# Patient Record
Sex: FEMALE | Employment: UNEMPLOYED | URBAN - METROPOLITAN AREA
[De-identification: names, ages, dates, MRNs, and addresses within clinical notes are randomized per-mention and may not be internally consistent; named-entity substitution may affect disease eponyms.]

---

## 2017-04-06 ENCOUNTER — HOSPITAL ENCOUNTER (EMERGENCY)
Age: 62
Discharge: HOME | End: 2017-04-06
Attending: EMERGENCY MEDICINE
Payer: MEDICARE

## 2017-04-06 ENCOUNTER — APPOINTMENT (OUTPATIENT)
Dept: GENERAL RADIOLOGY | Age: 62
End: 2017-04-06
Attending: EMERGENCY MEDICINE
Payer: MEDICARE

## 2017-04-06 VITALS
SYSTOLIC BLOOD PRESSURE: 130 MMHG | RESPIRATION RATE: 16 BRPM | HEART RATE: 78 BPM | OXYGEN SATURATION: 99 % | TEMPERATURE: 98.2 F | HEIGHT: 66 IN | WEIGHT: 190 LBS | BODY MASS INDEX: 30.53 KG/M2 | DIASTOLIC BLOOD PRESSURE: 78 MMHG

## 2017-04-06 DIAGNOSIS — R52 BODY ACHES: ICD-10-CM

## 2017-04-06 DIAGNOSIS — J06.9 VIRAL URI WITH COUGH: Primary | ICD-10-CM

## 2017-04-06 PROCEDURE — 71020 XR CHEST PA LAT: CPT

## 2017-04-06 PROCEDURE — 74011250637 HC RX REV CODE- 250/637: Performed by: PHYSICIAN ASSISTANT

## 2017-04-06 PROCEDURE — 99283 EMERGENCY DEPT VISIT LOW MDM: CPT

## 2017-04-06 RX ORDER — IBUPROFEN 400 MG/1
800 TABLET ORAL
Status: COMPLETED | OUTPATIENT
Start: 2017-04-06 | End: 2017-04-06

## 2017-04-06 RX ORDER — AZITHROMYCIN 250 MG/1
TABLET, FILM COATED ORAL
Qty: 5 TAB | Refills: 0 | Status: SHIPPED | OUTPATIENT
Start: 2017-04-06 | End: 2019-07-01

## 2017-04-06 RX ORDER — HYDROXYZINE PAMOATE 25 MG/1
50 CAPSULE ORAL
Status: COMPLETED | OUTPATIENT
Start: 2017-04-06 | End: 2017-04-06

## 2017-04-06 RX ADMIN — IBUPROFEN 800 MG: 400 TABLET, FILM COATED ORAL at 07:54

## 2017-04-06 RX ADMIN — HYDROXYZINE PAMOATE 50 MG: 25 CAPSULE ORAL at 07:54

## 2017-04-06 NOTE — ED NOTES
Bedside shift change report given to CARLOS Streeter (oncoming nurse) by Aleksey Narayanan RN   (offgoing nurse). Report included the following information SBAR, ED Summary, Intake/Output, MAR and Recent Results.

## 2017-04-06 NOTE — ED NOTES
Pt aox3. Pt c/o generalized body aches, productive cough with green/yellow sputum, and congestion x2 weeks. Pt states \"i have tried everything and it will not go away\".

## 2017-04-06 NOTE — ED PROVIDER NOTES
HPI Comments: 61yo female with history depression presents to ER complaining of cough, chest soreness with coughing, productive cough, body ache, nasal congestion, fever/chills. States symptoms started about 3 weeks ago. States she has been taking OTC cough and decongestant medications without relief. Admits to wheezing and SOB. Denies any history of smoking. Denies chest pain. Patient is a 64 y.o. female presenting with cough and general illness. Cough   Associated symptoms include chills, rhinorrhea, myalgias, shortness of breath and wheezing. Pertinent negatives include no chest pain, no headaches, no sore throat, no nausea and no vomiting. Generalized Body Aches   Associated symptoms include shortness of breath. Pertinent negatives include no chest pain, no abdominal pain and no headaches. Past Medical History:   Diagnosis Date    Hypertension     Other ill-defined conditions     High cholesterol    Psychiatric disorder     Depression       Past Surgical History:   Procedure Laterality Date    BREAST SURGERY PROCEDURE UNLISTED      Clogged milk duct (surgical)    HX GYN      Hysterectomy    HX ORTHOPAEDIC      Broken hip         History reviewed. No pertinent family history. Social History     Social History    Marital status:      Spouse name: N/A    Number of children: N/A    Years of education: N/A     Occupational History    Not on file. Social History Main Topics    Smoking status: Not on file    Smokeless tobacco: Not on file    Alcohol use Yes      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 [id number]    Drug use: No      Comment: In residential treatment    Sexual activity: Not on file     Other Topics Concern    Not on file     Social History Narrative         ALLERGIES: Doxycycline and Pcn [penicillins]    Review of Systems   Constitutional: Positive for activity change, chills, fatigue and fever. Negative for appetite change.    HENT: Positive for congestion and rhinorrhea. Negative for sore throat. Eyes: Negative. Respiratory: Positive for cough, shortness of breath and wheezing. Cardiovascular: Negative for chest pain. Gastrointestinal: Negative for abdominal pain, nausea and vomiting. Genitourinary: Negative for difficulty urinating. Musculoskeletal: Positive for myalgias. Negative for back pain, gait problem and neck pain. Neurological: Negative. Negative for weakness, numbness and headaches. All other systems reviewed and are negative. Vitals:    04/06/17 0542 04/06/17 0622   BP: (!) 145/92    Pulse: 73    Resp: 21    Temp: 98.2 °F (36.8 °C)    SpO2: 97% 97%   Weight: 86.2 kg (190 lb)    Height: 5' 6\" (1.676 m)             Physical Exam   Constitutional: She is oriented to person, place, and time. She appears well-developed and well-nourished. No distress. HENT:   Nose: Right sinus exhibits no maxillary sinus tenderness and no frontal sinus tenderness. Left sinus exhibits no maxillary sinus tenderness and no frontal sinus tenderness. Mouth/Throat: Oropharynx is clear and moist.   Scant nasal drainage. Eyes: Conjunctivae and EOM are normal. Pupils are equal, round, and reactive to light. Neck: Normal range of motion. Neck supple. Cardiovascular: Normal rate, regular rhythm and normal heart sounds. No murmur heard. Pulmonary/Chest: Effort normal and breath sounds normal. She has no wheezes. She has no rales. Abdominal: Soft. Bowel sounds are normal. She exhibits no distension. There is no tenderness. There is no guarding. Musculoskeletal: Normal range of motion. She exhibits no edema or tenderness. Lymphadenopathy:     She has no cervical adenopathy. Neurological: She is alert and oriented to person, place, and time. Skin: She is not diaphoretic. Psychiatric:   Slightly anxious affect   Nursing note and vitals reviewed.        MDM  Number of Diagnoses or Management Options  Body aches:   Viral URI with cough: Diagnosis management comments: 63yo female with cough and body aches. Symptoms started 3 weeks ago. Afebrile, BP slightly elevated, rest of vitals WNL. CXR without infiltrate but will cover with zpak given duration and patient expectations. PCP follow up.      ED Course       Procedures     CXR- no obvious infiltrate

## 2017-04-07 ENCOUNTER — OFFICE VISIT (OUTPATIENT)
Dept: ORTHOPEDIC SURGERY | Facility: CLINIC | Age: 62
End: 2017-04-07

## 2017-04-07 VITALS
HEIGHT: 66 IN | TEMPERATURE: 96.8 F | HEART RATE: 77 BPM | WEIGHT: 173.8 LBS | SYSTOLIC BLOOD PRESSURE: 136 MMHG | DIASTOLIC BLOOD PRESSURE: 95 MMHG | BODY MASS INDEX: 27.93 KG/M2

## 2017-04-07 DIAGNOSIS — M25.512 LEFT SHOULDER PAIN, UNSPECIFIED CHRONICITY: ICD-10-CM

## 2017-04-07 DIAGNOSIS — Z98.890 S/P ORIF (OPEN REDUCTION INTERNAL FIXATION) FRACTURE: ICD-10-CM

## 2017-04-07 DIAGNOSIS — M19.012 PRIMARY OSTEOARTHRITIS OF LEFT SHOULDER: Primary | ICD-10-CM

## 2017-04-07 DIAGNOSIS — Z86.59 HISTORY OF SUICIDAL IDEATION: ICD-10-CM

## 2017-04-07 DIAGNOSIS — M25.551 RIGHT HIP PAIN: ICD-10-CM

## 2017-04-07 DIAGNOSIS — Z87.81 S/P ORIF (OPEN REDUCTION INTERNAL FIXATION) FRACTURE: ICD-10-CM

## 2017-04-07 DIAGNOSIS — M16.0 PRIMARY OSTEOARTHRITIS OF BOTH HIPS: ICD-10-CM

## 2017-04-07 DIAGNOSIS — M54.5 LOW BACK PAIN, UNSPECIFIED BACK PAIN LATERALITY, UNSPECIFIED CHRONICITY, WITH SCIATICA PRESENCE UNSPECIFIED: ICD-10-CM

## 2017-04-07 NOTE — PATIENT INSTRUCTIONS
Hip Arthritis: Exercises  Your Care Instructions  Here are some examples of exercises for hip arthritis. Start each exercise slowly. Ease off the exercise if you start to have pain. Your doctor or physical therapist will tell you when you can start these exercises and which ones will work best for you. How to do the exercises  Straight-leg raises to the outside    1. Lie on your side, with your affected hip on top. 2. Tighten the front thigh muscles of your top leg to keep your knee straight. 3. Keep your hip and your leg straight in line with the rest of your body, and keep your knee pointing forward. Do not drop your hip back. 4. Lift your top leg straight up toward the ceiling, about 12 inches off the floor. Hold for about 6 seconds, then slowly lower your leg. 5. Repeat 8 to 12 times. 6. Switch legs and repeat steps 1 through 5, even if only one hip is sore. Straight-leg raises to the inside    1. Lie on your side with your affected hip on the floor. 2. You can either prop up your other leg on a chair, or you can bend that knee and put that foot in front of your other knee. Do not drop your hip back. 3. Tighten the muscles on the front thigh of your bottom leg to straighten that knee. 4. Keep your kneecap pointing forward and your leg straight, and lift your bottom leg up toward the ceiling about 6 inches. Hold for about 6 seconds, then lower slowly. 5. Repeat 8 to 12 times. 6. Switch legs and repeat steps 1 through 5, even if only one hip is sore. Hip hike    1. Stand sideways on the bottom step of a staircase, and hold on to the banister or wall. 2. Keeping both knees straight, lift your good leg off the step and let it hang down. Then hike your good hip up to the same level as your affected hip or a little higher. 3. Repeat 8 to 12 times. 4. Switch legs and repeat steps 1 through 3, even if only one hip is sore. Bridging    1. Lie on your back with both knees bent.  Your knees should be bent about 90 degrees. 2. Then push your feet into the floor, squeeze your buttocks, and lift your hips off the floor until your shoulders, hips, and knees are all in a straight line. 3. Hold for about 6 seconds as you continue to breathe normally, and then slowly lower your hips back down to the floor and rest for up to 10 seconds. 4. Repeat 8 to 12 times. Hamstring stretch (lying down)    1. Lie flat on your back with your legs straight. If you feel discomfort in your back, place a small towel roll under your lower back. 2. Holding the back of your affected leg, lift your leg straight up and toward your body until you feel a stretch at the back of your thigh. 3. Hold the stretch for at least 30 seconds. 4. Repeat 2 to 4 times. 5. Switch legs and repeat steps 1 through 4, even if only one hip is sore. Standing quadriceps stretch    1. If you are not steady on your feet, hold on to a chair, counter, or wall. You can also lie on your stomach or your side to do this exercise. 2. Bend the knee of the leg you want to stretch, and reach behind you to grab the front of your foot or ankle with the hand on the same side. For example, if you are stretching your right leg, use your right hand. 3. Keeping your knees next to each other, pull your foot toward your buttock until you feel a gentle stretch across the front of your hip and down the front of your thigh. Your knee should be pointed directly to the ground, and not out to the side. 4. Hold the stretch for at least 15 to 30 seconds. 5. Repeat 2 to 4 times. 6. Switch legs and repeat steps 1 through 5, even if only one hip is sore. Hip rotator stretch    1. Lie on your back with both knees bent and your feet flat on the floor. 2. Put the ankle of your affected leg on your opposite thigh near your knee. 3. Use your hand to gently push your knee away from your body until you feel a gentle stretch around your hip.   4. Hold the stretch for 15 to 30 seconds. 5. Repeat 2 to 4 times. 6. Repeat steps 1 through 5, but this time use your hand to gently pull your knee toward your opposite shoulder. 7. Switch legs and repeat steps 1 through 6, even if only one hip is sore. Knee-to-chest    1. Lie on your back with your knees bent and your feet flat on the floor. 2. Bring your affected leg to your chest, keeping the other foot flat on the floor (or keeping the other leg straight, whichever feels better on your lower back). 3. Keep your lower back pressed to the floor. Hold for at least 15 to 30 seconds. 4. Relax, and lower the knee to the starting position. 5. Repeat 2 to 4 times. 6. Switch legs and repeat steps 1 through 5, even if only one hip is sore. 7. To get more stretch, put your other leg flat on the floor while pulling your knee to your chest.  Clamshell    1. Lie on your side, with your affected hip on top. Keep your feet and knees together and your knees bent. 2. Raise your top knee, but keep your feet together. Do not let your hips roll back. Your legs should open up like a clamshell. 3. Hold for 6 seconds. 4. Slowly lower your knee back down. Rest for 10 seconds. 5. Repeat 8 to 12 times. 6. Switch legs and repeat steps 1 through 5, even if only one hip is sore. Follow-up care is a key part of your treatment and safety. Be sure to make and go to all appointments, and call your doctor if you are having problems. It's also a good idea to know your test results and keep a list of the medicines you take. Where can you learn more? Go to http://oziel-sanchez.info/. Enter C613 in the search box to learn more about \"Hip Arthritis: Exercises. \"  Current as of: May 23, 2016  Content Version: 11.2  © 4044-1404 [employer]. Care instructions adapted under license by [employer] (which disclaims liability or warranty for this information).  If you have questions about a medical condition or this instruction, always ask your healthcare professional. [place] [address] any warranty or liability for your use of this information. Shoulder Arthritis: Exercises  Your Care Instructions  Here are some examples of typical rehabilitation exercises for your condition. Start each exercise slowly. Ease off the exercise if you start to have pain. Your doctor or physical therapist will tell you when you can start these exercises and which ones will work best for you. How to do the exercises  Shoulder flexion (lying down)    Note: To make a wand for this exercise, use a piece of PVC pipe or a broom handle with the broom removed. Make the wand about a foot wider than your shoulders. 7. Lie on your back, holding a wand with both hands. Your palms should face down as you hold the wand. 8. Keeping your elbows straight, slowly raise your arms over your head. Raise them until you feel a stretch in your shoulders, upper back, and chest.  9. Hold for 15 to 30 seconds. 10. Repeat 2 to 4 times. Shoulder rotation (lying down)    Note: To make a wand for this exercise, use a piece of PVC pipe or a broom handle with the broom removed. Make the wand about a foot wider than your shoulders. 7. Lie on your back. Hold a wand with both hands with your elbows bent and palms up. 8. Keep your elbows close to your body, and move the wand across your body toward the sore arm. 9. Hold for 8 to 12 seconds. 10. Repeat 2 to 4 times. Shoulder internal rotation with towel    5. Hold a towel above and behind your head with the arm that is not sore. 6. With your sore arm, reach behind your back and grasp the towel. 7. With the arm above your head, pull the towel upward. Do this until you feel a stretch on the front and outside of your sore shoulder. 8. Hold 15 to 30 seconds. 9. Repeat 2 to 4 times. Shoulder blade squeeze    5. Stand with your arms at your sides, and squeeze your shoulder blades together.  Do not raise your shoulders up as you squeeze. 6. Hold 6 seconds. 7. Repeat 8 to 12 times. Resisted rows    Note: For this exercise, you will need elastic exercise material, such as surgical tubing or Thera-Band. 6. Put the band around a solid object at about waist level. (A bedpost will work well.) Each hand should hold an end of the band. 7. With your elbows at your sides and bent to 90 degrees, pull the band back. Your shoulder blades should move toward each other. Return to the starting position. 8. Repeat 8 to 12 times. External rotator strengthening exercise    7. Start by tying a piece of elastic exercise material to a doorknob. You can use surgical tubing or Thera-Band. (You may also hold one end of the band in each hand.)  8. Stand or sit with your shoulder relaxed and your elbow bent 90 degrees. Your upper arm should rest comfortably against your side. Squeeze a rolled towel between your elbow and your body for comfort. This will help keep your arm at your side. 9. Hold one end of the elastic band with the hand of the painful arm. 10. Start with your forearm across your belly. Slowly rotate the forearm out away from your body. Keep your elbow and upper arm tucked against the towel roll or the side of your body until you begin to feel tightness in your shoulder. Slowly move your arm back to where you started. 11. Repeat 8 to 12 times. Internal rotator strengthening exercise    8. Start by tying a piece of elastic exercise material to a doorknob. You can use surgical tubing or Thera-Band. 9. Stand or sit with your shoulder relaxed and your elbow bent 90 degrees. Your upper arm should rest comfortably against your side. Squeeze a rolled towel between your elbow and your body for comfort. This will help keep your arm at your side. 10. Hold one end of the elastic band in the hand of the painful arm. 11. Slowly rotate your forearm toward your body until it touches your belly.  Slowly move it back to where you started. 12. Keep your elbow and upper arm firmly tucked against the towel roll or at your side. 13. Repeat 8 to 12 times. Pendulum swing    Note: If you have pain in your back, do not do this exercise. 8. Hold on to a table or the back of a chair with your good arm. Then bend forward a little and let your sore arm hang straight down. This exercise does not use the arm muscles. Rather, use your legs and your hips to create movement that makes your arm swing freely. 9. Use the movement from your hips and legs to guide the slightly swinging arm back and forth like a pendulum (or elephant trunk). Then guide it in circles that start small (about the size of a dinner plate). Make the circles a bit larger each day, as your pain allows. 10. Do this exercise for 5 minutes, 5 to 7 times each day. 11. As you have less pain, try bending over a little farther to do this exercise. This will increase the amount of movement at your shoulder. Follow-up care is a key part of your treatment and safety. Be sure to make and go to all appointments, and call your doctor if you are having problems. It's also a good idea to know your test results and keep a list of the medicines you take. Where can you learn more? Go to http://oziel-sanchez.info/. Enter H562 in the search box to learn more about \"Shoulder Arthritis: Exercises. \"  Current as of: May 23, 2016  Content Version: 11.2  © 9582-2732 [employer]. Care instructions adapted under license by [employer] (which disclaims liability or warranty for this information). If you have questions about a medical condition or this instruction, always ask your healthcare professional. [place] [address] any warranty or liability for your use of this information.

## 2017-04-07 NOTE — PROGRESS NOTES
Chief Complaint   Patient presents with    Shoulder Pain     Left    Back Pain     Lower    Hip Pain     Right
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
## 2017-04-07 NOTE — MR AVS SNAPSHOT
Visit Information Date & Time Provider Department Dept. Phone Encounter #  
 4/7/2017 10:50 AM Anya Quintanilla MD [facility] - [facility] [phone] Follow-up Instructions Return if symptoms worsen or fail to improve. Your Appointments 4/7/2017 10:50 AM  
New Patient with Anya Quintanilla MD  
[address] [facility], [address] and [facility] - [facility] Appt Note: hip pain nx humana mdcr, refd nikko reich  
 [address], [address]  
[phone]  
  
   
 [address], [address] [address] Upcoming Health Maintenance Date Due Hepatitis C Screening 1955 DTaP/Tdap/Td series (1 - Tdap) 9/30/1976 PAP AKA CERVICAL CYTOLOGY 9/30/1976 BREAST CANCER SCRN MAMMOGRAM 9/30/2005 FOBT Q 1 YEAR AGE 50-75 9/30/2005 ZOSTER VACCINE AGE 60> 9/30/2015 INFLUENZA AGE 9 TO ADULT 8/1/2016 Allergies as of 4/7/2017  Review Complete On: 4/7/2017 By: Anya Quintanilla MD  
  
 Severity Noted Reaction Type Reactions Doxycycline  12/27/2012    Swelling Pcn [Penicillins]  02/23/2011    Hives PATIENT IS NOT ALLERGIC TO AMOXICLLIN. Current Immunizations  Never Reviewed No immunizations on file. Not reviewed this visit You Were Diagnosed With   
  
 Codes Comments Primary osteoarthritis of left shoulder    -  Primary ICD-10-CM: M19.012 
ICD-9-CM: 715.11 Moderate Left shoulder pain, unspecified chronicity     ICD-10-CM: M25.512 ICD-9-CM: 719.41 Low back pain, unspecified back pain laterality, unspecified chronicity, with sciatica presence unspecified     ICD-10-CM: M54.5 ICD-9-CM: 724.2 Right hip pain     ICD-10-CM: M25.551 ICD-9-CM: 719.45 Primary osteoarthritis of both hips     ICD-10-CM: M16.0 ICD-9-CM: 715.15 Moderate  S/P ORIF (open reduction internal fixation) fracture     ICD-10-CM: Z96.7, Z87.81 
 ICD-9-CM: V45.89, V15.51 Right hip History of suicidal ideation     ICD-10-CM: Z86.59 
ICD-9-CM: V11.8 Vitals BP Pulse Temp Height(growth percentile) Weight(growth percentile) BMI  
 (!) 136/95 77 96.8 °F (36 °C) (Oral) 5' 6\" (1.676 m) 173 lb 12.8 oz (78.8 kg) 28.05 kg/m2 OB Status Smoking Status Hysterectomy Former Smoker BMI and BSA Data Body Mass Index Body Surface Area 28.05 kg/m 2 1.92 m 2 Your Updated Medication List  
  
   
This list is accurate as of: 4/7/17 10:34 AM.  Always use your most recent med list. amLODIPine 10 mg tablet Commonly known as:  Opal Heymann Take 10 mg by mouth daily. azithromycin 250 mg tablet Commonly known as:  Mona Lematien Take two tablets today then one tablet daily  
  
 calcium-vitamin D 250-125 mg-unit tablet Commonly known as:  OSCAL 250 Take 1 Tab by mouth daily. dextromethorphan-guaiFENesin  mg/5 mL syrup Commonly known as:  ROBITUSSIN-DM Take 10 mL by mouth every six (6) hours as needed for Cough. FISH OIL 1,000 mg Cap Generic drug:  omega-3 fatty acids-vitamin e Take  by mouth. LIPITOR 20 mg tablet Generic drug:  atorvastatin Take  by mouth daily. lisinopril 20 mg tablet Commonly known as:  Joanne Feil Take 20 mg by mouth daily. valACYclovir 500 mg tablet Commonly known as:  VALTREX Take  by mouth two (2) times a day. We Performed the Following AMB POC X-RAY RADEX HIP UNI WITH PELVIS 2-3 VIEWS [06418 CPT(R)] AMB POC XRAY, SHOULDER; COMPLETE, 2+ [17674 CPT(R)] REFERRAL TO PAIN MANAGEMENT [VVE987 Custom] Comments:  
 medication management REFERRAL TO PSYCHIATRY [REF91 Custom] Comments:  
 [facility]. Follow-up Instructions Return if symptoms worsen or fail to improve. Referral Information Referral ID Referred By Referred To [id number] Zee BARRERA Not Available Visits Status Start Date End Date 1 New Request 4/7/17 4/7/18 If your referral has a status of pending review or denied, additional information will be sent to support the outcome of this decision. Referral ID Referred By Referred To  
 [id number] Zee BARRERA Not Available Visits Status Start Date End Date 1 New Request 4/7/17 4/7/18 If your referral has a status of pending review or denied, additional information will be sent to support the outcome of this decision. Patient Instructions Hip Arthritis: Exercises Your Care Instructions Here are some examples of exercises for hip arthritis. Start each exercise slowly. Ease off the exercise if you start to have pain. Your doctor or physical therapist will tell you when you can start these exercises and which ones will work best for you. How to do the exercises Straight-leg raises to the outside 1. Lie on your side, with your affected hip on top. 2. Tighten the front thigh muscles of your top leg to keep your knee straight. 3. Keep your hip and your leg straight in line with the rest of your body, and keep your knee pointing forward. Do not drop your hip back. 4. Lift your top leg straight up toward the ceiling, about 12 inches off the floor. Hold for about 6 seconds, then slowly lower your leg. 5. Repeat 8 to 12 times. 6. Switch legs and repeat steps 1 through 5, even if only one hip is sore. Straight-leg raises to the inside 1. Lie on your side with your affected hip on the floor. 2. You can either prop up your other leg on a chair, or you can bend that knee and put that foot in front of your other knee. Do not drop your hip back. 3. Tighten the muscles on the front thigh of your bottom leg to straighten that knee. 4. Keep your kneecap pointing forward and your leg straight, and lift your bottom leg up toward the ceiling about 6 inches.  Hold for about 6 seconds, then lower slowly. 5. Repeat 8 to 12 times. 6. Switch legs and repeat steps 1 through 5, even if only one hip is sore. Hip hike 1. Stand sideways on the bottom step of a staircase, and hold on to the banister or wall. 2. Keeping both knees straight, lift your good leg off the step and let it hang down. Then hike your good hip up to the same level as your affected hip or a little higher. 3. Repeat 8 to 12 times. 4. Switch legs and repeat steps 1 through 3, even if only one hip is sore. Bridging 1. Lie on your back with both knees bent. Your knees should be bent about 90 degrees. 2. Then push your feet into the floor, squeeze your buttocks, and lift your hips off the floor until your shoulders, hips, and knees are all in a straight line. 3. Hold for about 6 seconds as you continue to breathe normally, and then slowly lower your hips back down to the floor and rest for up to 10 seconds. 4. Repeat 8 to 12 times. Hamstring stretch (lying down) 1. Lie flat on your back with your legs straight. If you feel discomfort in your back, place a small towel roll under your lower back. 2. Holding the back of your affected leg, lift your leg straight up and toward your body until you feel a stretch at the back of your thigh. 3. Hold the stretch for at least 30 seconds. 4. Repeat 2 to 4 times. 5. Switch legs and repeat steps 1 through 4, even if only one hip is sore. Standing quadriceps stretch 1. If you are not steady on your feet, hold on to a chair, counter, or wall. You can also lie on your stomach or your side to do this exercise. 2. Bend the knee of the leg you want to stretch, and reach behind you to grab the front of your foot or ankle with the hand on the same side. For example, if you are stretching your right leg, use your right hand.  
3. Keeping your knees next to each other, pull your foot toward your buttock until you feel a gentle stretch across the front of your hip and down the front of your thigh. Your knee should be pointed directly to the ground, and not out to the side. 4. Hold the stretch for at least 15 to 30 seconds. 5. Repeat 2 to 4 times. 6. Switch legs and repeat steps 1 through 5, even if only one hip is sore. Hip rotator stretch 1. Lie on your back with both knees bent and your feet flat on the floor. 2. Put the ankle of your affected leg on your opposite thigh near your knee. 3. Use your hand to gently push your knee away from your body until you feel a gentle stretch around your hip. 4. Hold the stretch for 15 to 30 seconds. 5. Repeat 2 to 4 times. 6. Repeat steps 1 through 5, but this time use your hand to gently pull your knee toward your opposite shoulder. 7. Switch legs and repeat steps 1 through 6, even if only one hip is sore. Knee-to-chest 
 
1. Lie on your back with your knees bent and your feet flat on the floor. 2. Bring your affected leg to your chest, keeping the other foot flat on the floor (or keeping the other leg straight, whichever feels better on your lower back). 3. Keep your lower back pressed to the floor. Hold for at least 15 to 30 seconds. 4. Relax, and lower the knee to the starting position. 5. Repeat 2 to 4 times. 6. Switch legs and repeat steps 1 through 5, even if only one hip is sore. 7. To get more stretch, put your other leg flat on the floor while pulling your knee to your chest. 
Clamshell 1. Lie on your side, with your affected hip on top. Keep your feet and knees together and your knees bent. 2. Raise your top knee, but keep your feet together. Do not let your hips roll back. Your legs should open up like a clamshell. 3. Hold for 6 seconds. 4. Slowly lower your knee back down. Rest for 10 seconds. 5. Repeat 8 to 12 times. 6. Switch legs and repeat steps 1 through 5, even if only one hip is sore. Follow-up care is a key part of your treatment and safety.  Be sure to make and go to all appointments, and call your doctor if you are having problems. It's also a good idea to know your test results and keep a list of the medicines you take. Where can you learn more? Go to http://oziel-sanchez.info/. Enter Q473 in the search box to learn more about \"Hip Arthritis: Exercises. \" Current as of: May 23, 2016 Content Version: 11.2 © 2290-4598 [employer]. Care instructions adapted under license by [employer] (which disclaims liability or warranty for this information). If you have questions about a medical condition or this instruction, always ask your healthcare professional. [address] any warranty or liability for your use of this information. Shoulder Arthritis: Exercises Your Care Instructions Here are some examples of typical rehabilitation exercises for your condition. Start each exercise slowly. Ease off the exercise if you start to have pain. Your doctor or physical therapist will tell you when you can start these exercises and which ones will work best for you. How to do the exercises Shoulder flexion (lying down) Note: To make a wand for this exercise, use a piece of PVC pipe or a broom handle with the broom removed. Make the wand about a foot wider than your shoulders. 7. Lie on your back, holding a wand with both hands. Your palms should face down as you hold the wand. 8. Keeping your elbows straight, slowly raise your arms over your head. Raise them until you feel a stretch in your shoulders, upper back, and chest. 
9. Hold for 15 to 30 seconds. 10. Repeat 2 to 4 times. Shoulder rotation (lying down) Note: To make a wand for this exercise, use a piece of PVC pipe or a broom handle with the broom removed. Make the wand about a foot wider than your shoulders. 7. Lie on your back. Hold a wand with both hands with your elbows bent and palms up. 8. Keep your elbows close to your body, and move the wand across your body toward the sore arm. 9. Hold for 8 to 12 seconds. 10. Repeat 2 to 4 times. Shoulder internal rotation with towel 5. Hold a towel above and behind your head with the arm that is not sore. 6. With your sore arm, reach behind your back and grasp the towel. 7. With the arm above your head, pull the towel upward. Do this until you feel a stretch on the front and outside of your sore shoulder. 8. Hold 15 to 30 seconds. 9. Repeat 2 to 4 times. Shoulder blade squeeze 5. Stand with your arms at your sides, and squeeze your shoulder blades together. Do not raise your shoulders up as you squeeze. 6. Hold 6 seconds. 7. Repeat 8 to 12 times. Resisted rows Note: For this exercise, you will need elastic exercise material, such as surgical tubing or Thera-Band. 6. Put the band around a solid object at about waist level. (A bedpost will work well.) Each hand should hold an end of the band. 7. With your elbows at your sides and bent to 90 degrees, pull the band back. Your shoulder blades should move toward each other. Return to the starting position. 8. Repeat 8 to 12 times. External rotator strengthening exercise 7. Start by tying a piece of elastic exercise material to a doorknob. You can use surgical tubing or Thera-Band. (You may also hold one end of the band in each hand.) 8. Stand or sit with your shoulder relaxed and your elbow bent 90 degrees. Your upper arm should rest comfortably against your side. Squeeze a rolled towel between your elbow and your body for comfort. This will help keep your arm at your side. 9. Hold one end of the elastic band with the hand of the painful arm. 10. Start with your forearm across your belly. Slowly rotate the forearm out away from your body.  Keep your elbow and upper arm tucked against the towel roll or the side of your body until you begin to feel tightness in your shoulder. Slowly move your arm back to where you started. 11. Repeat 8 to 12 times. Internal rotator strengthening exercise 8. Start by tying a piece of elastic exercise material to a doorknob. You can use surgical tubing or Thera-Band. 9. Stand or sit with your shoulder relaxed and your elbow bent 90 degrees. Your upper arm should rest comfortably against your side. Squeeze a rolled towel between your elbow and your body for comfort. This will help keep your arm at your side. 10. Hold one end of the elastic band in the hand of the painful arm. 11. Slowly rotate your forearm toward your body until it touches your belly. Slowly move it back to where you started. 12. Keep your elbow and upper arm firmly tucked against the towel roll or at your side. 13. Repeat 8 to 12 times. Pendulum swing Note: If you have pain in your back, do not do this exercise. 8. Hold on to a table or the back of a chair with your good arm. Then bend forward a little and let your sore arm hang straight down. This exercise does not use the arm muscles. Rather, use your legs and your hips to create movement that makes your arm swing freely. 9. Use the movement from your hips and legs to guide the slightly swinging arm back and forth like a pendulum (or elephant trunk). Then guide it in circles that start small (about the size of a dinner plate). Make the circles a bit larger each day, as your pain allows. 10. Do this exercise for 5 minutes, 5 to 7 times each day. 11. As you have less pain, try bending over a little farther to do this exercise. This will increase the amount of movement at your shoulder. Follow-up care is a key part of your treatment and safety. Be sure to make and go to all appointments, and call your doctor if you are having problems. It's also a good idea to know your test results and keep a list of the medicines you take. Where can you learn more? Go to http://oziel-sanchez.info/. Enter H562 in the search box to learn more about \"Shoulder Arthritis: Exercises. \" Current as of: May 23, 2016 Content Version: 11.2 © 0541-0247 [employer], [email or web address]. Care instructions adapted under license by [employer] (which disclaims liability or warranty for this information). If you have questions about a medical condition or this instruction, always ask your healthcare professional. [address] any warranty or liability for your use of this information. Introducing \[facility]\"" & HEALTH SERVICES! Travis Rajput introduces [employer] patient portal. Now you can access parts of your medical record, email your doctor's office, and request medication refills online. 1. In your internet browser, go to [email or web address] 2. Click on the First Time User? Click Here link in the Sign In box. You will see the New Member Sign Up page. 3. Enter your [employer] Access Code exactly as it appears below. You will not need to use this code after youve completed the sign-up process. If you do not sign up before the expiration date, you must request a new code. · [employer] Access Code: [email or web address] Expires: 7/5/2017  5:36 AM 
 
4. Enter the last four digits of your Social Security Number (xxxx) and Date of Birth (mm/dd/yyyy) as indicated and click Submit. You will be taken to the next sign-up page. 5. Create a [employer] ID. This will be your [employer] login ID and cannot be changed, so think of one that is secure and easy to remember. 6. Create a [employer] password. You can change your password at any time. 7. Enter your Password Reset Question and Answer. This can be used at a later time if you forget your password. 8. Enter your e-mail address. You will receive e-mail notification when new information is available in [address]. 9. Click Sign Up. You can now view and download portions of your medical record. 10. Click the Download Summary menu link to download a portable copy of your medical information. If you have questions, please visit the Frequently Asked Questions section of the [employer] website. Remember, [employer] is NOT to be used for urgent needs. For medical emergencies, dial 911. Now available from your iPhone and Android! Please provide this summary of care documentation to your next provider. Your primary care clinician is listed as [address] [place] [address]. If you have any questions after today's visit, please call [phone].

## 2017-08-29 ENCOUNTER — HOSPITAL ENCOUNTER (OUTPATIENT)
Dept: GENERAL RADIOLOGY | Age: 62
Discharge: HOME | End: 2017-08-29
Payer: MEDICARE

## 2017-08-29 DIAGNOSIS — R05.9 COUGH: ICD-10-CM

## 2017-08-29 PROCEDURE — 71020 XR CHEST PA LAT: CPT

## 2019-02-01 ENCOUNTER — OFFICE VISIT (OUTPATIENT)
Dept: ORTHOPEDIC SURGERY | Facility: CLINIC | Age: 64
End: 2019-02-01

## 2019-02-01 VITALS
DIASTOLIC BLOOD PRESSURE: 84 MMHG | WEIGHT: 179 LBS | SYSTOLIC BLOOD PRESSURE: 150 MMHG | BODY MASS INDEX: 28.77 KG/M2 | RESPIRATION RATE: 16 BRPM | OXYGEN SATURATION: 98 % | HEIGHT: 66 IN | TEMPERATURE: 97.6 F | HEART RATE: 73 BPM

## 2019-02-01 DIAGNOSIS — M19.012 PRIMARY OSTEOARTHRITIS OF LEFT SHOULDER: ICD-10-CM

## 2019-02-01 DIAGNOSIS — M25.512 LEFT SHOULDER PAIN, UNSPECIFIED CHRONICITY: Primary | ICD-10-CM

## 2019-02-01 RX ORDER — DICLOFENAC SODIUM 50 MG/1
50 TABLET, DELAYED RELEASE ORAL 2 TIMES DAILY WITH MEALS
Qty: 180 TAB | Refills: 2 | Status: SHIPPED | OUTPATIENT
Start: 2019-02-01

## 2019-02-01 RX ORDER — TRIAMCINOLONE ACETONIDE 40 MG/ML
40 INJECTION, SUSPENSION INTRA-ARTICULAR; INTRAMUSCULAR ONCE
Qty: 1 ML | Refills: 0
Start: 2019-02-01 | End: 2019-02-01

## 2019-02-01 NOTE — PROGRESS NOTES
Patient: Jacquelynn Buerger                MRN: [id number]       SSN: [id number] YOB: 1955        AGE: 61 y.o. SEX: female Body mass index is 28.89 kg/m². PCP: Clark West MD 
02/01/19 Chief Complaint: Left shoulder pain HISTORY OF PRESENT ILLNESS:   Romulo Issa is a 61year-old female who comes in the office today with years of left shoulder pain. The pain is worse when she tries to reach overhead. She also has pain at night. She has been seen several places and had an injection in her shoulder, which she says did not help her pain. She reports pain as 9/10 on a pain scale. She has had no advanced imaging recently. Past Medical History:  
Diagnosis Date  Hypertension  Other ill-defined conditions(799.89) High cholesterol  Psychiatric disorder Depression History reviewed. No pertinent family history. Current Outpatient Medications Medication Sig Dispense Refill  diclofenac EC (VOLTAREN) 50 mg EC tablet Take 1 Tab by mouth two (2) times daily (with meals). 180 Tab 2  
 triamcinolone acetonide (KENALOG) 40 mg/mL injection 1 mL by Intra artICUlar route once for 1 dose. 1 mL 0  
 amlodipine (NORVASC) 10 mg tablet Take 10 mg by mouth daily.  lisinopril (PRINIVIL, ZESTRIL) 20 mg tablet Take 20 mg by mouth daily.  atorvastatin (LIPITOR) 20 mg tablet Take  by mouth daily.  dextromethorphan-guaiFENesin (ROBITUSSIN-DM)  mg/5 mL syrup Take 10 mL by mouth every six (6) hours as needed for Cough. 240 mL 0  
 azithromycin (ZITHROMAX Z-FÉLIX) 250 mg tablet Take two tablets today then one tablet daily 5 Tab 0  
 omega-3 fatty acids-vitamin e (FISH OIL) 1,000 mg Cap Take  by mouth.  calcium-vitamin D (OSCAL 250) 250-125 mg-unit tablet Take 1 Tab by mouth daily.  valacyclovir (VALTREX) 500 mg tablet Take  by mouth two (2) times a day. Allergies Allergen Reactions  Doxycycline Swelling  Pcn [Penicillins] Hives PATIENT IS NOT ALLERGIC TO AMOXICLLIN. Past Surgical History:  
Procedure Laterality Date  BREAST SURGERY PROCEDURE UNLISTED Clogged milk duct (surgical)  HX GYN Hysterectomy  HX ORTHOPAEDIC Broken hip Social History Socioeconomic History  Marital status: UNKNOWN Spouse name: Not on file  Number of children: Not on file  Years of education: Not on file  Highest education level: Not on file Social Needs  Financial resource strain: Not on file  Food insecurity - worry: Not on file  Food insecurity - inability: Not on file  Transportation needs - medical: Not on file  Transportation needs - non-medical: Not on file Occupational History  Not on file Tobacco Use  Smoking status: Former Smoker  Smokeless tobacco: Never Used Substance and Sexual Activity  Alcohol use: Yes 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 [id number]  Drug use: No  
  Comment: In residential treatment  Sexual activity: Not on file Other Topics Concern  Not on file Social History Narrative  Not on file REVIEW OF SYSTEMS:   
 
CON: negative for recent weight loss/gain, fever, or chills EYE: negative for double or blurry vision ENT: negative for hoarseness RS:   negative for cough, URI, SOB 
CV:  negative for chest pain, palpitations GI:    negative for blood in stool, nausea/vomiting :  negative for blood in urine MS: As per HPI Other systems reviewed and noted below. PHYSICAL EXAMINATION: 
Visit Vitals /84 (BP 1 Location: Left arm, BP Patient Position: Sitting) Pulse 73 Temp 97.6 °F (36.4 °C) (Oral) Resp 16 Ht 5' 6\" (1.676 m) Wt 179 lb (81.2 kg) SpO2 98% BMI 28.89 kg/m² Body mass index is 28.89 kg/m². GENERAL: Alert and oriented x3, in no acute distress, well-developed, well-nourished. HEENT: Normocephalic, atraumatic. RESP: Non labored breathing with equal chest rise on inspiration. CV: Well perfused extremities. No cyanosis or clubbing noted. ABDOMEN: Soft, non-tender, non-distended. PHYSICAL EXAMINATION:   Exam of the left shoulder with no effusion, warmth or erythema. She does have limitations in her range of motion. Forward flexion is only to about 120 degrees. External rotation of 20 degrees. Internal rotation to the posterolateral buttock. Gentle rotator cuff strength testing reveals some pain without any weakness. She is neurovascularly intact distally. Nontender over the [facility] joint. IMAGING:   X-rays of the left shoulder were taken in the office today. These show arthritic changes in the glenohumeral joint. ASSESSMENT/PLAN:   Hanna Chung is a 61year-old female with likely left shoulder arthritis. She may also have a rotator cuff injury. In order to further evaluate this, we discussed an MRI, but she would like to hold off on that for now. I offered her a cortisone injection into the shoulder. She was agreeable to this. Therefore, after discussing the risks and benefits, the left shoulder was injected. I will see her back if she continues to have problems. [facility] PROCEDURE PROGRESS NOTE Chart reviewed for the following: 
 Isa Holguin MD, have reviewed the History, Physical and updated the Allergic reactions for [facility] TIME OUT performed immediately prior to start of procedure: 
 Isa Holguin MD, have performed the following reviews on [facility] prior to the start of the procedure: 
         
* Patient was identified by name and date of birth * Agreement on procedure being performed was verified * Risks and Benefits explained to the patient * Procedure site verified and marked as necessary * Patient was positioned for comfort * Consent was signed and verified Time: 1500 Date of procedure: 2/1/2019 Procedure performed by:  Leonel Tai MD 
 
Provider assisted by: Alexis Nguyễn LPN Patient assisted by: self How tolerated by patient: tolerated the procedure well with no complications Post Procedural Pain Scale: 0 - No Hurt Comments: none Electronically signed by: Leonel Tai MD

## 2019-07-01 ENCOUNTER — ANESTHESIA EVENT (OUTPATIENT)
Dept: ENDOSCOPY | Age: 64
End: 2019-07-01
Payer: MEDICARE

## 2019-07-01 RX ORDER — FAMOTIDINE 20 MG/1
20 TABLET, FILM COATED ORAL ONCE
Status: CANCELLED | OUTPATIENT
Start: 2019-07-01 | End: 2019-07-01

## 2019-07-01 RX ORDER — SODIUM CHLORIDE, SODIUM LACTATE, POTASSIUM CHLORIDE, CALCIUM CHLORIDE 600; 310; 30; 20 MG/100ML; MG/100ML; MG/100ML; MG/100ML
50 INJECTION, SOLUTION INTRAVENOUS CONTINUOUS
Status: CANCELLED | OUTPATIENT
Start: 2019-07-01 | End: 2019-07-02

## 2019-07-01 RX ORDER — LIDOCAINE HYDROCHLORIDE 10 MG/ML
0.1 INJECTION, SOLUTION EPIDURAL; INFILTRATION; INTRACAUDAL; PERINEURAL AS NEEDED
Status: CANCELLED | OUTPATIENT
Start: 2019-07-01

## 2019-07-01 NOTE — PERIOP NOTES
PAT - SURGICAL PRE-ADMISSION INSTRUCTIONS    NAME:  Kal Nation                                                          TODAY'S DATE:  7/1/2019    SURGERY DATE:  7/2/2019                                  SURGERY ARRIVAL TIME:   830 PER OFFICE    1. Do NOT eat or drink anything, including candy or gum, after MIDNIGHT on 7/1/19 , unless you have specific instructions from your Surgeon or Anesthesia Provider to do so. 2. No smoking on the day of surgery. 3. No alcohol 24 hours prior to the day of surgery. 4. No recreational drugs for one week prior to the day of surgery. 5. Leave all valuables, including money/purse, at home. 6. Remove all jewelry, nail polish, makeup (including mascara); no lotions, powders, deodorant, or perfume/cologne/after shave. 7. Glasses/Contact lenses and Dentures may be worn to the hospital.  They will be removed prior to surgery. 8. Call your doctor if symptoms of a cold or illness develop within 24 ours prior to surgery. 9. AN ADULT MUST DRIVE YOU HOME AFTER OUTPATIENT SURGERY. 10. If you are having an OUTPATIENT procedure, please make arrangements for a responsible adult to be with you for 24 hours after your surgery. 11. If you are admitted to the hospital, you will be assigned to a bed after surgery is complete. Normally a family member will not be able to see you until you are in your assigned bed. 15. Family is encouraged to accompany you to the hospital.  We ask visitors in the treatment area to be limited to ONE person at a time to ensure patient privacy. EXCEPTIONS WILL BE MADE AS NEEDED. 15. Children under 12 are discouraged from entering the treatment area and need to be supervised by an adult when in the waiting room. Special Instructions: Take these medications the morning of surgery with a sip of water:  AMLODIPINE, Complete bowel prep per MD instructions.     Patient Prep:    shower with anti-bacterial soap    These surgical instructions were reviewed with PATIENT during the PAT PHONE CALL. Directions: On the morning of surgery, please go to the [facility]. Enter the building from the [facility] entrance, 1st floor (next to the Emergency Room entrance). Take the elevator to the 2nd floor. Sign in at the Registration Desk.     If you have any questions and/or concerns, please do not hesitate to call:  (Prior to the day of surgery)  [facility] unit:  [phone]  (Day of surgery)  [facility] unit:  [phone]

## 2019-07-02 ENCOUNTER — HOSPITAL ENCOUNTER (OUTPATIENT)
Age: 64
Setting detail: OUTPATIENT SURGERY
Discharge: HOME | End: 2019-07-02
Attending: INTERNAL MEDICINE | Admitting: INTERNAL MEDICINE
Payer: MEDICARE

## 2019-07-02 ENCOUNTER — ANESTHESIA (OUTPATIENT)
Dept: ENDOSCOPY | Age: 64
End: 2019-07-02
Payer: MEDICARE

## 2019-07-02 VITALS
HEIGHT: 67 IN | RESPIRATION RATE: 15 BRPM | OXYGEN SATURATION: 97 % | HEART RATE: 82 BPM | SYSTOLIC BLOOD PRESSURE: 106 MMHG | DIASTOLIC BLOOD PRESSURE: 74 MMHG | WEIGHT: 175 LBS | BODY MASS INDEX: 27.47 KG/M2 | TEMPERATURE: 97.9 F

## 2019-07-02 PROCEDURE — 88305 TISSUE EXAM BY PATHOLOGIST: CPT

## 2019-07-02 PROCEDURE — 74011250636 HC RX REV CODE- 250/636

## 2019-07-02 PROCEDURE — 77030031670 HC DEV INFL ENDOTEK BIG60 MRTM -B: Performed by: INTERNAL MEDICINE

## 2019-07-02 PROCEDURE — 76060000032 HC ANESTHESIA 0.5 TO 1 HR: Performed by: INTERNAL MEDICINE

## 2019-07-02 PROCEDURE — 76040000007: Performed by: INTERNAL MEDICINE

## 2019-07-02 PROCEDURE — 74011000250 HC RX REV CODE- 250

## 2019-07-02 RX ORDER — SODIUM CHLORIDE 0.9 % (FLUSH) 0.9 %
5-40 SYRINGE (ML) INJECTION AS NEEDED
Status: CANCELLED | OUTPATIENT
Start: 2019-07-02

## 2019-07-02 RX ORDER — PROPOFOL 10 MG/ML
INJECTION, EMULSION INTRAVENOUS AS NEEDED
Status: DISCONTINUED | OUTPATIENT
Start: 2019-07-02 | End: 2019-07-02 | Stop reason: HOSPADM

## 2019-07-02 RX ORDER — SODIUM CHLORIDE, SODIUM LACTATE, POTASSIUM CHLORIDE, CALCIUM CHLORIDE 600; 310; 30; 20 MG/100ML; MG/100ML; MG/100ML; MG/100ML
INJECTION, SOLUTION INTRAVENOUS
Status: DISCONTINUED | OUTPATIENT
Start: 2019-07-02 | End: 2019-07-02 | Stop reason: HOSPADM

## 2019-07-02 RX ORDER — SODIUM CHLORIDE 0.9 % (FLUSH) 0.9 %
5-40 SYRINGE (ML) INJECTION EVERY 8 HOURS
Status: CANCELLED | OUTPATIENT
Start: 2019-07-02

## 2019-07-02 RX ORDER — FENTANYL CITRATE 50 UG/ML
50 INJECTION, SOLUTION INTRAMUSCULAR; INTRAVENOUS AS NEEDED
Status: CANCELLED | OUTPATIENT
Start: 2019-07-02

## 2019-07-02 RX ORDER — HYDROMORPHONE HYDROCHLORIDE 2 MG/ML
0.5 INJECTION, SOLUTION INTRAMUSCULAR; INTRAVENOUS; SUBCUTANEOUS
Status: CANCELLED | OUTPATIENT
Start: 2019-07-02

## 2019-07-02 RX ORDER — DEXTROMETHORPHAN/PSEUDOEPHED 2.5-7.5/.8
1.2 DROPS ORAL
Status: CANCELLED | OUTPATIENT
Start: 2019-07-02

## 2019-07-02 RX ORDER — MAGNESIUM SULFATE 100 %
4 CRYSTALS MISCELLANEOUS AS NEEDED
Status: CANCELLED | OUTPATIENT
Start: 2019-07-02

## 2019-07-02 RX ORDER — PROPOFOL 10 MG/ML
INJECTION, EMULSION INTRAVENOUS
Status: DISCONTINUED | OUTPATIENT
Start: 2019-07-02 | End: 2019-07-02 | Stop reason: HOSPADM

## 2019-07-02 RX ORDER — ONDANSETRON 2 MG/ML
4 INJECTION INTRAMUSCULAR; INTRAVENOUS ONCE
Status: CANCELLED | OUTPATIENT
Start: 2019-07-02 | End: 2019-07-02

## 2019-07-02 RX ADMIN — PROPOFOL 160 MCG/KG/MIN: 10 INJECTION, EMULSION INTRAVENOUS at 10:51

## 2019-07-02 RX ADMIN — PROPOFOL 80 MG: 10 INJECTION, EMULSION INTRAVENOUS at 10:46

## 2019-07-02 RX ADMIN — PROPOFOL 20 MG: 10 INJECTION, EMULSION INTRAVENOUS at 10:50

## 2019-07-02 RX ADMIN — SODIUM CHLORIDE, SODIUM LACTATE, POTASSIUM CHLORIDE, CALCIUM CHLORIDE: 600; 310; 30; 20 INJECTION, SOLUTION INTRAVENOUS at 10:15

## 2019-07-02 NOTE — PERIOP NOTES
Phase 2 Recovery Summary    Report received from [address] [place] [address] Street:    07/01/19 1116 07/02/19 1012 07/02/19 1101 07/02/19 1108   BP:  117/79 (!) 106/4 106/74   Pulse:  87 71 82   Resp:  16 12 15   Temp:  97.9 °F (36.6 °C)     SpO2:  99% 96% 97%   Weight: 79.4 kg (175 lb)      Height: 5' 6.5\" (1.689 m) 5' 6.5\" (1.689 m)         oriented to time, place, person and situation    Lines and Drains  Peripheral Intravenous Line:      Wound          Patient discharged to Home with family (non verbal)    Katy Wagner RN

## 2019-07-02 NOTE — ANESTHESIA PREPROCEDURE EVALUATION
Relevant Problems   No relevant active problems       Anesthetic History   No history of anesthetic complications            Review of Systems / Medical History  Patient summary reviewed and pertinent labs reviewed    Pulmonary                   Neuro/Psych         Psychiatric history     Cardiovascular    Hypertension                   GI/Hepatic/Renal                Endo/Other        Arthritis     Other Findings              Physical Exam    Airway  Mallampati: II  TM Distance: 4 - 6 cm  Neck ROM: normal range of motion   Mouth opening: Normal     Cardiovascular    Rhythm: regular  Rate: normal         Dental    Dentition: Poor dentition and Full upper dentures     Pulmonary  Breath sounds clear to auscultation               Abdominal  GI exam deferred       Other Findings            Anesthetic Plan    ASA: 2  Anesthesia type: MAC            Anesthetic plan and risks discussed with: Patient

## 2019-07-02 NOTE — DISCHARGE INSTRUCTIONS
Patient Discharge Instructions    Abhishek Neil / [id number] : 1955    Admitted 2019 Discharged: 2019         Procedure Impression:  1. Gastritis with small erosions. Biopsies taken for H. Pylori. 2. Otherwise normal EGD. 3. Internal hemorrhoids. 4. Otherwise normal colonoscopy. Recommendation:  1. Resume regular diet. 2. Will contact with biopsy results in 2 weeks   3. Please contact our office if you have not received the results by three weeks. 4. Continue current treatment plan. 5. Repeat colonoscopy in 10 years. Recommended Diet: Regular Diet, High fiber. Recommended Activity:    1. Do not drink alcohol, drive or operate machinery for 12 hours   2. Call if any fever, abdominal pain or bleeding noted. Signed By: Carrie Chambers MD     2019         DISCHARGE SUMMARY from Nurse    PATIENT INSTRUCTIONS:    After general anesthesia or intravenous sedation, for 24 hours or while taking prescription Narcotics:  · Limit your activities  · Do not drive and operate hazardous machinery  · Do not make important personal or business decisions  · Do  not drink alcoholic beverages  · If you have not urinated within 8 hours after discharge, please contact your surgeon on call. Report the following to your surgeon:  · Excessive pain, swelling, redness or odor of or around the surgical area  · Temperature over 100.5  · Nausea and vomiting lasting longer than 4 hours or if unable to take medications  · Any signs of decreased circulation or nerve impairment to extremity: change in color, persistent  numbness, tingling, coldness or increase pain  · Any questions    What to do at Home:      No smoking/ No tobacco products/ Avoid exposure to second hand smoke  Surgeon General's Warning:  Quitting smoking now greatly reduces serious risk to your health.     Obesity, smoking, and sedentary lifestyle greatly increases your risk for illness    A healthy diet, regular physical exercise & weight monitoring are important for maintaining a healthy lifestyle    You may be retaining fluid if you have a history of heart failure or if you experience any of the following symptoms:  Weight gain of 3 pounds or more overnight or 5 pounds in a week, increased swelling in our hands or feet or shortness of breath while lying flat in bed. Please call your doctor as soon as you notice any of these symptoms; do not wait until your next office visit. The discharge information has been reviewed with the patient. The patient verbalized understanding. Discharge medications reviewed with the patient and appropriate educational materials and side effects teaching were provided. Patient armband removed and given to patient to take home.   Patient was informed of the privacy risks if armband lost or stolen

## 2019-07-02 NOTE — H&P
History and Physical    Devlin Saliva        1955  [id number]        [id number]     Pre-Procedure Diagnosis:  gerd k21.9 constipation  k59.00    Chief Complaint:  No chief complaint on file. HPI: Patient with chronic GERD. She has no dysphagia or weight loss. She has chronic constipation with recent worsening. No blood in stool. No diarrhea. No abdominal pain. No other compalints. Past Medical History:   Diagnosis Date    Adverse effect of anesthesia     Arthritis     Hyperlipemia     Hypertension     Other ill-defined conditions(419.89)     High cholesterol    Psychiatric disorder     Depression     Past Surgical History:   Procedure Laterality Date    BREAST SURGERY PROCEDURE UNLISTED      Clogged milk duct (surgical)    HX GYN      Hysterectomy    HX ORTHOPAEDIC      Broken hip     History reviewed. No pertinent family history. Social History     Socioeconomic History    Marital status: LEGALLY      Spouse name: Not on file    Number of children: Not on file    Years of education: Not on file    Highest education level: Not on file   Tobacco Use    Smoking status: Former Smoker    Smokeless tobacco: Never Used   Substance and Sexual Activity    Alcohol use: Yes     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 [id number]    Drug use: No     Comment: In residential treatment       Allergies: Allergies   Allergen Reactions    Doxycycline Swelling    Pcn [Penicillins] Hives     PATIENT IS NOT ALLERGIC TO AMOXICLLIN. Medications:   No current facility-administered medications for this encounter. Vital Signs   Visit Vitals  /79   Pulse 87   Temp 97.9 °F (36.6 °C)   Resp 16   Ht 5' 6.5\" (1.689 m)   Wt 79.4 kg (175 lb)   SpO2 99%   BMI 27.82 kg/m²       Review of Systems  A comprehensive review of systems was negative except for that written in the History of Present Illness. Physical Exam:  General:  Alert, cooperative, no distress, appears stated age.    Eyes: Conjunctivae/corneas clear. PERRL, EOMs intact. Fundi benign           Mouth/Throat: Lips, mucosa, and tongue normal. Teeth and gums normal.   Neck: Supple, symmetrical, trachea midline, no adenopathy, thyroid: no enlargement/tenderness/nodules, no carotid bruit and no JVD. Lungs:   Clear to auscultation bilaterally. Heart:  Regular rate and rhythm, S1, S2 normal, no murmur, click, rub or gallop. Abdomen:   Soft, non-tender. Bowel sounds normal. No masses,  No organomegaly. Extremities: Extremities normal, atraumatic, no cyanosis or edema. Skin: Skin color, texture, turgor normal. No rashes or lesions             Laboratory Data:  No results found for this or any previous visit (from the past 24 hour(s)). Hospital Problems  Date Reviewed: 7/2/2019    None          Impression and Plan:  GERD and Constipation. Recommend EGD and colonoscopy.   See prior H&P      Argentina Navarro MD  7/2/2019  10:23 AM

## 2019-07-02 NOTE — PROCEDURES
Endoscopy Procedure Note    Patient: Santos Salas MRN: [id number]  SSN: [id number]    YOB: 1955  Age: 61 y.o. Sex: female      Date/Time:  7/2/2019 11:05 AM    Esophagogastroduodenoscopy (EGD) Procedure Note    Procedure: Esophagogastroduodenoscopy with biopsy    IMPRESSION:   1. Gastritis with small erosions. Biopsies taken for H. Pylori. 2. Otherwise normal EGD. RECOMMENDATIONS:  1. Resume regular diet. 2. Will contact with biopsy results in 2 weeks. Indication: GERD  :  Luther Barrow MD  Assistants: Endoscopy Technician-1: [place], [address] [place] [address]  Endoscopy RN-1: Gagan Marshall RN    Referring Provider:   Keri Meredith MD  History: The history and physical exam were reviewed and updated. Endoscope: Olympus GIF-190 diagnostic endoscope  Extent of Exam: third portion of the duodenum  ASA: ASA 3 - Patient with moderate systemic disease with functional limitations  Anethesia/Sedation:  MAC anesthesia    Description of the procedure: The procedure was discussed with the patient including risks, benefits, alternatives including risks of iv sedation, bleeding, perforation and aspiration. A safety timeout was performed. The patient was placed in the left lateral decubitus position. A bite block was placed. The patient was given incremental doses of intravenous sedation until moderate sedation was achieved. The patients vital signs were monitored at all times including heart rate/rhythm, blood pressure and oxygen saturation. The endoscope was then passed under direct visualization to the third portion of the duodenum. The endoscope was then slowly withdrawn while visualizing the mucosa. In the stomach a retroflexion was performed and gastric fundus and cardia visualized. The endoscope was then slowly withdrawn. The patient was then transferred to recovery in stable condition. Findings:    Esophagus: The esophageal mucosa was normal with no ulceration, mass or stricture. There was no evidence of Charles's esophagus or reflux esophagitis. Stomach: The gastric mucosa was edematous and erythematous with small erosions. No ulceration, mass, stricture. Four pinch biopsies taken for H. Pylori. Duodenum: The duodenum mucosa was normal with no ulceration, mass, stricture and no evidence of villous atrophy. Therapies:  None    Specimens:   ID Type Source Tests Collected by Time Destination   1 : Bx R/O H. pylori Preservative Gastric  Violeta Farmer MD 7/2/2019 1049 Pathology               Complications:   None; patient tolerated the procedure well. EBL:None    Discharge disposition:  Home in the company of  when able to ambulate    Ricky Ortiz MD  July 2, 2019  11:05 AM          Colonoscopy Report    Patient: Shawn Neville MRN: [id number]  SSN: [id number]    YOB: 1955  Age: 61 y.o. Sex: female      Date of Procedure: 7/2/2019    IMPRESSION:  1. Hemorrhoids. 2. Otherwise normal colonoscopy. RECOMMENDATIONS:  1. Resume regular diet, Recommend high fiber. 2. Repeat colonoscopy in 10 years for average risk screening.       Indication:  Constipation  Procedure Performed: Colonoscopy   Endoscopist: iRcky Ortiz MD  Assistant: Endoscopy Technician-1: [address]  Endoscopy RN-1: Demi Cabrales RN  ASA: ASA 3 - Patient with moderate systemic disease with functional limitations  Mallampati Score: II (soft palate, uvula, fauces visible)  Anesthesia: MAC anesthesia  Endoscope:     [x]  CF H 190AL   []  PCF H190AL   []  GIF H 190    Extent of Examination:cecum, which was identified by the ileocecal valve and appendiceal orifice  Quality of Preparation:     []  Excellent   [x]  Very Good   [] Fair but adequate   [] Fair, inadequate   []  Poor      Technique: The procedure was discussed with the patient including risks, benefits, alternatives including risks of IV sedation, bleeding, perforation and missed polyp. A safety timeout was performed. The patient was given incremental doses of intravenous sedation to achieve moderate sedation. The patients vital signs were monitored at all times including heart rate, rhythm, blood pressure and oxygen saturation. The patient was placed in left lateral position. When adequate sedation was achieved a perianal inspection and a digital rectal exam were performed. Video colonoscope was introduced into the rectum and advanced under direct vision up to the cecum, which was identified by the ileocecal valve and appendiceal orifice. The cecum was identified by IC valve, appendiceal orifice and crows foot. With adequate insufflation and maneuvering of the withdrawing scope, the colonic mucosa was visualized carefully. Retroflexion was performed in the rectum and the distal rectum visualized. The patient tolerated the procedure very well and was transferred to recovery area. Findings:  1. Normal rectal exam.   2. There were small hemorrhoids in the distal rectum. 3. The colonic mucosa was otherwise normal with no polyps, masses, ulcerations, strictures.       EBL:None  Specimen:   ID Type Source Tests Collected by Time Destination   1 : Bx R/O H. pylori Preservative Gastric  Flor Lilly MD 7/2/2019 1049 Pathology       Rowena Patrick MD  July 2, 2019  11:06 AM

## 2019-10-28 ENCOUNTER — OFFICE VISIT (OUTPATIENT)
Dept: ORTHOPEDIC SURGERY | Facility: CLINIC | Age: 64
End: 2019-10-28

## 2019-10-28 VITALS
OXYGEN SATURATION: 98 % | TEMPERATURE: 96.4 F | HEIGHT: 67 IN | WEIGHT: 179 LBS | BODY MASS INDEX: 28.09 KG/M2 | SYSTOLIC BLOOD PRESSURE: 140 MMHG | DIASTOLIC BLOOD PRESSURE: 94 MMHG | RESPIRATION RATE: 18 BRPM | HEART RATE: 72 BPM

## 2019-10-28 DIAGNOSIS — M17.11 PRIMARY OSTEOARTHRITIS OF RIGHT KNEE: Primary | ICD-10-CM

## 2019-10-28 DIAGNOSIS — M19.012 PRIMARY OSTEOARTHRITIS OF LEFT SHOULDER: ICD-10-CM

## 2019-10-28 DIAGNOSIS — M17.12 PRIMARY OSTEOARTHRITIS OF LEFT KNEE: ICD-10-CM

## 2019-10-28 DIAGNOSIS — M25.562 ACUTE PAIN OF LEFT KNEE: ICD-10-CM

## 2019-10-28 DIAGNOSIS — M25.561 ACUTE PAIN OF RIGHT KNEE: ICD-10-CM

## 2019-10-28 DIAGNOSIS — G89.29 CHRONIC LEFT SHOULDER PAIN: ICD-10-CM

## 2019-10-28 DIAGNOSIS — M25.512 CHRONIC LEFT SHOULDER PAIN: ICD-10-CM

## 2019-10-28 RX ORDER — PANTOPRAZOLE SODIUM 40 MG/1
40 TABLET, DELAYED RELEASE ORAL
COMMUNITY
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
## 2020-03-03 ENCOUNTER — OFFICE VISIT (OUTPATIENT)
Dept: NEUROLOGY | Age: 65
End: 2020-03-03

## 2020-03-03 VITALS
HEIGHT: 66 IN | DIASTOLIC BLOOD PRESSURE: 80 MMHG | BODY MASS INDEX: 28.83 KG/M2 | OXYGEN SATURATION: 98 % | HEART RATE: 74 BPM | TEMPERATURE: 98.4 F | SYSTOLIC BLOOD PRESSURE: 120 MMHG | WEIGHT: 179.4 LBS

## 2020-03-03 DIAGNOSIS — R47.9 DIFFICULTY WITH SPEECH: Primary | ICD-10-CM

## 2020-03-03 DIAGNOSIS — R41.89 SUBJECTIVE MEMORY COMPLAINTS: ICD-10-CM

## 2020-03-03 NOTE — LETTER
3/3/20 Patient: Shirley Iqbal YOB: 1955 Date of Visit: 3/3/2020 Dorinda Harper MD 
[address] [place] [id number] [address] [place] [address] VIA Facsimile: [phone] Dear Dorinda Harper MD, Thank you for referring Ms. Eliezer Moreno to [address] [place] [address] for evaluation. My notes for this consultation are attached. If you have questions, please do not hesitate to call me. I look forward to following your patient along with you. Sincerely, Bianka Castellon MD

## 2020-03-03 NOTE — PROGRESS NOTES
Sydnee Ash is a 59 y.o. female . presents for Memory Loss (memory changes) and New Patient   . A 60 yo female patient with HTN, depression, HLD came for evaluation of memory problems. A month ago, she noticed that she has some difficulty coming up with the right word when she is speaking. She has no difficulty understanding speech. Is progressively getting better but she still has some difficulty with word finding. She claims that she forgets. She sometimes goes into her room and forgets the reason she went there. Mild difficulty finding her own items. No difficulty with names. No difficulty finding places. She does puzzles and claims that she is getting better. People told the results she has some testing of the face to the left side. She also claims that her left leg is weak. No falls but this morning had tripped. No balance difficulty. She claims that she had a stroke in the 1990s; she does not remember what the deficits were. No history of head traumas. She has headaches over the past 2 weeks and thinks that is related to her sinus infection. She does not have any fever. No limitations in her activities of daily life from her forgetfulness. She lives independently. Review of Systems   Constitutional: Positive for malaise/fatigue. Negative for chills, diaphoresis, fever and weight loss. HENT: Positive for hearing loss. Negative for tinnitus. Eyes: Positive for blurred vision (occasionally). Negative for double vision. Respiratory: Positive for cough (occasionally), sputum production and shortness of breath (climbing stairs). Negative for hemoptysis. Cardiovascular: Negative for chest pain and leg swelling. Gastrointestinal: Positive for heartburn, nausea and vomiting. Genitourinary: Negative for dysuria, frequency and urgency. Musculoskeletal: Positive for joint pain (left shoulder; ). Negative for neck pain. Skin: Negative for itching and rash.    Neurological: Positive for tingling (left hand; middle of palm; not the fingers), speech change (word finding difficulty), weakness (left leg sometimes) and headaches (2 weeks; sinus infection). Negative for dizziness and tremors. Endo/Heme/Allergies: Bruises/bleeds easily. Psychiatric/Behavioral: Positive for depression and memory loss. Negative for hallucinations and suicidal ideas. The patient has insomnia. Past Medical History:   Diagnosis Date    Adverse effect of anesthesia     Arthritis     Hyperlipemia     Hypertension     Other ill-defined conditions(799.89)     High cholesterol    Psychiatric disorder     Depression       Past Surgical History:   Procedure Laterality Date    BREAST SURGERY PROCEDURE UNLISTED      Clogged milk duct (surgical)    COLONOSCOPY N/A 7/2/2019    COL[facility] performed [facility] at [facility] END[facility]COPY    HX GYN      Hysterectomy    HX ORTHOPAEDIC      Broken hip        No family history on file.      Social History     Socioeconomic History    Marital status: UNKNOWN     Spouse name: Not on file    Number of children: Not on file    Years of education: Not on file    Highest education level: Not on file   Occupational History    Not on file   Social Needs    Financial resource strain: Not on file    Food insecurity:     Worry: Not on file     Inability: Not on file    Transportation needs:     Medical: Not on file     Non-medical: Not on file   Tobacco Use    Smoking status: Former Smoker    Smokeless tobacco: Never Used   Substance and Sexual Activity    Alcohol use: Yes     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 [id number]    Drug use: No     Comment: In residential treatment    Sexual activity: Not on file   Lifestyle    Physical activity:     Days per week: Not on file     Minutes per session: Not on file    Stress: Not on file   Relationships    Social connections:     Talks on phone: Not on file     Gets together: Not on file     Attends Episcopal service: Not on file     Active member of club or organization: Not on file     Attends meetings of clubs or organizations: Not on file     Relationship status: Not on file    Intimate partner violence:     Fear of current or ex partner: Not on file     Emotionally abused: Not on file     Physically abused: Not on file     Forced sexual activity: Not on file   Other Topics Concern    Not on file   Social History Narrative    Not on file        Allergies   Allergen Reactions    Doxycycline Swelling    Pcn [Penicillins] Hives     PATIENT IS NOT ALLERGIC  [address] Current Outpatient Medications   Medication Sig Dispense Refill    pantoprazole (PROTONIX) 40 mg tablet Take 40 mg by mouth.  diclofenac (VOLTAREN) 1 % gel Apply 2 g to affected area four (4) times daily. 100 g 2    omega-3 fatty acids-vitamin e (FISH OIL) 1,000 mg Cap Take  by mouth.  calcium-vitamin D (OSCAL 250) 250-125 mg-unit tablet Take 1 Tab by mouth daily.  amlodipine (NORVASC) 10 mg tablet Take 10 mg by mouth daily.  lisinopril (PRINIVIL, ZESTRIL) 20 mg tablet Take 20 mg by mouth daily.  atorvastatin (LIPITOR) 20 mg tablet Take  by mouth daily.  valacyclovir (VALTREX) 500 mg tablet Take  by mouth two (2) times a day.  OTHER abx for tooth      meloxicam (MOBIC) 15 mg tablet Take 1 Tab by mouth daily (with breakfast). 30 Tab 2    diclofenac EC (VOLTAREN) 50 mg EC tablet Take 1 Tab by mouth two (2) times daily (with meals). 180 Tab 2         Physical Exam  Constitutional:       Appearance: Normal appearance. HENT:      Head: Normocephalic and atraumatic. Mouth/Throat:      Mouth: Mucous membranes are moist.      Pharynx: Oropharynx is clear. No oropharyngeal exudate. Eyes:      Extraocular Movements: Extraocular movements intact. Pupils: Pupils are equal, round, and reactive to light. Neck:      Musculoskeletal: Normal range of motion and neck supple.    Cardiovascular:      Rate and Rhythm: Normal rate. Heart sounds: No murmur. No gallop. Pulmonary:      Effort: Pulmonary effort is normal. No respiratory distress. Breath sounds: No wheezing or rales. Musculoskeletal: Normal range of motion. General: No swelling. Right lower leg: No edema. Left lower leg: No edema. Neurological:      Mental Status: She is alert. Comments: Neuro Exam:     General: awake and alert  CVS: RRR  Chest: no labored breathing. Extremities: warm and pulses palpable. Abdomen: soft and non tender  Mental status: Awake, alert, oriented x3, follows simple, complex and inverted commands, no neglect, no extinction to DSS or VSS, immediate recall 3/3 and delayed recall 3/3. MMSE is 30/30. Speech and languge: fluent, coherent, naming and repitition intact, reading and comprehension intact  CN: VFF, EOMI, PERRLA, face sensation intact , no facial asymmetry noted, palate elevation symmetric bilat, SS+SCM 5/5 bilat, tongue midline  Motor: no pronator drift, tone normal throughout, strength 5/5 throughout  Sensory: intact to light touch throughout  Coordination: FNF, HS accurate w/o dysmetria  DTR: 2+ throughout, toes downgoing BL  Gait: normal.             No visits with results within 3 Month(s) from this visit. Latest known visit with results is:   Admission on 12/12/2010, Discharged on 12/12/2010   Component Date Value Ref Range Status    Group A Strep Ag ID 12/12/2010 NEGATIVE   NEGATIVE Final    Comment: PHYSICIAN PLEASE NOTE: A NEGATIVE STREPT SCREEN DOES NOT EXCLUDE A DIAGNOSIS                            OF STREPTOCOCCAL INFECTION. NEGATIVE SCREENS ARE FOLLOWED  BY A CULTURE. CULTURE RESULTS TO FOLLOW.  Influenza A Ag 12/12/2010 NEGATIVE   NEGATIVE Final    Comment: NEGATIVE FOR THE ANTIGEN. INFECTION DUE TO FLU A CANNOT BE RULED OUT.                            ANTIGEN IN THE SAMPLE MAY BE BELOW                           THE DETECTION LIMIT OF THE TEST. CULTURE OF NEGATIVE SAMPLES IS SUGGESTED.  Influenza B Ag 12/12/2010 NEGATIVE   NEGATIVE Final    Comment: NEGATIVE FOR FLU B PROTEIN ANTIGEN                           INFECTION DUE TO FLU B CANNOT BE RULED OUT                           FLU B ANTIGEN IN THE SAMPLE MAY BE BELOW                           THE DETECTION LIMIT OF THE TEST.  Specimen Description: 12/12/2010 THROAT   Final    Special Requests: 12/12/2010 NO SPECIAL REQUESTS   Final    Culture result: 12/12/2010 NO BETA STREP ISOLATED   Final    Report Status 12/12/2010 12/14/2010 FINAL   Final             ICD-10-CM ICD-9-CM    1. Difficulty with speech R47.9 784.59 MRI BRAIN WO CONT   2. Subjective memory complaints R41.89 780.93 MRI BRAIN WO CONT       A 59years old female patient  with above medical problems came for evaluation of speech difficulty and forgetfulness over the past 1 month. She has some word finding difficulties in conversations. Is progressively getting better. She has mild forgetfulness. Her Mini-Mental status exam score is 30/30. Patient has depression. Possible pseudodementia. The change in her speech which started suddenly is concerning for a CVA. I have ordered MRI of her brain. We will call her with the results of the MRI.

## 2020-03-10 ENCOUNTER — OFFICE VISIT (OUTPATIENT)
Dept: ORTHOPEDIC SURGERY | Facility: CLINIC | Age: 65
End: 2020-03-10

## 2020-03-10 VITALS
RESPIRATION RATE: 18 BRPM | HEART RATE: 73 BPM | HEIGHT: 66 IN | TEMPERATURE: 97.1 F | WEIGHT: 177.6 LBS | DIASTOLIC BLOOD PRESSURE: 87 MMHG | SYSTOLIC BLOOD PRESSURE: 118 MMHG | BODY MASS INDEX: 28.54 KG/M2 | OXYGEN SATURATION: 99 %

## 2020-03-10 DIAGNOSIS — M19.012 PRIMARY OSTEOARTHRITIS OF LEFT SHOULDER: Primary | ICD-10-CM

## 2020-03-10 NOTE — PROGRESS NOTES
Patient: Viridiana Barcenas                MRN: [id number]       SSN: [id number]  YOB: 1955        AGE: 59 y.o. SEX: female  Body mass index is 28.67 kg/m². PCP: Sophia Fine MD  03/10/20    Chief Complaint: Left shoulder pain follow up    HISTORY OF PRESENT ILLNESS:  Eldred Angelucci returns today for her left shoulder. She continues to have left shoulder pain and dysfunction. She has difficulty with raising her arm. This is significant pain and has failed to respond to cortisone injections. Past Medical History:   Diagnosis Date    Adverse effect of anesthesia     Arthritis     Hyperlipemia     Hypertension     Other ill-defined conditions(459.42)     High cholesterol    Psychiatric disorder     Depression       History reviewed. No pertinent family history. Current Outpatient Medications   Medication Sig Dispense Refill    pantoprazole (PROTONIX) 40 mg tablet Take 40 mg by mouth.  diclofenac (VOLTAREN) 1 % gel Apply 2 g to affected area four (4) times daily. 100 g 2    omega-3 fatty acids-vitamin e (FISH OIL) 1,000 mg Cap Take  by mouth.  calcium-vitamin D (OSCAL 250) 250-125 mg-unit tablet Take 1 Tab by mouth daily.  amlodipine (NORVASC) 10 mg tablet Take 10 mg by mouth daily.  lisinopril (PRINIVIL, ZESTRIL) 20 mg tablet Take 20 mg by mouth daily.  atorvastatin (LIPITOR) 20 mg tablet Take  by mouth daily.  OTHER abx for tooth      meloxicam (MOBIC) 15 mg tablet Take 1 Tab by mouth daily (with breakfast). 30 Tab 2    diclofenac EC (VOLTAREN) 50 mg EC tablet Take 1 Tab by mouth two (2) times daily (with meals). 180 Tab 2    valacyclovir (VALTREX) 500 mg tablet Take  by mouth two (2) times a day. Allergies   Allergen Reactions    Doxycycline Swelling    Pcn [Penicillins] Hives     PATIENT IS NOT ALLERGIC TO AMOXICLLIN.        Past Surgical History:   Procedure Laterality Date    BREAST SURGERY PROCEDURE UNLISTED      Clogged milk duct (surgical)    COLONOSCOPY N/A 7/2/2019    COL[facility] Bebo Laura [facility] at [facility] ENDOSCOPY    HX GYN      Hysterectomy    HX ORTHOPAEDIC      Broken hip       Social History     Socioeconomic History    Marital status: UNKNOWN     Spouse name: Not on file    Number of children: Not on file    Years of education: Not on file    Highest education level: Not on file   Occupational History    Not on file   Social Needs    Financial resource strain: Not on file    Food insecurity:     Worry: Not on file     Inability: Not on file    Transportation needs:     Medical: Not on file     Non-medical: Not on file   Tobacco Use    Smoking status: Former Smoker    Smokeless tobacco: Never Used   Substance and Sexual Activity    Alcohol use: Yes     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 [id number]    Drug use: No     Comment: In residential treatment    Sexual activity: Not on file   Lifestyle    Physical activity:     Days per week: Not on file     Minutes per session: Not on file    Stress: Not on file   Relationships    Social connections:     Talks on phone: Not on file     Gets together: Not on file     Attends Confucianist service: Not on file     Active member of club or organization: Not on file     Attends meetings of clubs or organizations: Not on file     Relationship status: Not on file    Intimate partner violence:     Fear of current or ex partner: Not on file     Emotionally abused: Not on file     Physically abused: Not on file     Forced sexual activity: Not on file   Other Topics Concern    Not on file   Social History Narrative    Not on file       REVIEW OF SYSTEMS:      No changes from previous review of systems unless noted. PHYSICAL EXAMINATION:  Visit Vitals  /87   Pulse 73   Temp 97.1 °F (36.2 °C) (Oral)   Resp 18   Ht 5' 6\" (1.676 m)   Wt 177 lb 9.6 oz (80.6 kg)   SpO2 99%   BMI 28.67 kg/m²     Body mass index is 28.67 kg/m².   GENERAL: Alert and oriented x3, in no acute distress. HEENT: Normocephalic, atraumatic. RESP: Non labored breathing. SKIN: No rashes or lesions noted. PHYSICAL EXAM:  Physical exam of the left shoulder with decreased range of motion due to pain. Weakness with rotator cuff strength testing. Crepitus with shoulder range of motion. She is neurovascularly intact. IMAGING:  X-rays were taken in the office today. These show significant glenohumeral arthritis with slight elevation of the humeral head. ASSESSMENT AND PLAN:   Wenceslao Bansal is a 59year-old female with left shoulder osteoarthritis. This may be rotator cuff arthropathy. I would like for her to get an MRI to further evaluate the rotator cuff, as she is considering surgery and this will help decide which surgery moving forward. We will plan to see her back after the MRI is completed.               Electronically signed by: Merna Shields MD

## 2020-03-14 ENCOUNTER — HOSPITAL ENCOUNTER (OUTPATIENT)
Dept: MRI IMAGING | Age: 65
Discharge: HOME | End: 2020-03-14
Attending: STUDENT IN AN ORGANIZED HEALTH CARE EDUCATION/TRAINING PROGRAM
Payer: MEDICARE

## 2020-03-14 DIAGNOSIS — R47.9 DIFFICULTY WITH SPEECH: ICD-10-CM

## 2020-03-14 DIAGNOSIS — R41.89 SUBJECTIVE MEMORY COMPLAINTS: ICD-10-CM

## 2020-03-14 PROCEDURE — 70551 MRI BRAIN STEM W/O DYE: CPT

## 2020-03-21 ENCOUNTER — HOSPITAL ENCOUNTER (OUTPATIENT)
Dept: MRI IMAGING | Age: 65
Discharge: HOME | End: 2020-03-21
Attending: ORTHOPAEDIC SURGERY
Payer: MEDICARE

## 2020-03-21 DIAGNOSIS — M19.012 PRIMARY OSTEOARTHRITIS OF LEFT SHOULDER: ICD-10-CM

## 2020-03-21 PROCEDURE — 73221 MRI JOINT UPR EXTREM W/O DYE: CPT

## 2020-04-06 DIAGNOSIS — M19.012 PRIMARY OSTEOARTHRITIS OF LEFT SHOULDER: Primary | ICD-10-CM

## 2020-04-06 NOTE — TELEPHONE ENCOUNTER
Last Visit: 3/10/20 with MD Bhakta  Next Appointment: none  Previous Refill Encounter(s): 2/20/19 #100 with 2 refills    Requested Prescriptions     Pending Prescriptions Disp Refills    diclofenac (Voltaren) 1 % gel 100 g 2     Sig: Apply 2 g to affected area four (4) times daily.

## 2020-04-07 RX ORDER — DICLOFENAC SODIUM 10 MG/G
2 GEL TOPICAL 4 TIMES DAILY
Qty: 100 G | Refills: 2 | Status: SHIPPED | OUTPATIENT
Start: 2020-04-07

## 2020-07-29 ENCOUNTER — OFFICE VISIT (OUTPATIENT)
Dept: NEUROLOGY | Age: 65
End: 2020-07-29

## 2020-07-29 VITALS
HEIGHT: 66 IN | HEART RATE: 73 BPM | BODY MASS INDEX: 27.03 KG/M2 | RESPIRATION RATE: 16 BRPM | TEMPERATURE: 96.5 F | DIASTOLIC BLOOD PRESSURE: 78 MMHG | OXYGEN SATURATION: 99 % | WEIGHT: 168.2 LBS | SYSTOLIC BLOOD PRESSURE: 112 MMHG

## 2020-07-29 DIAGNOSIS — R41.89 SUBJECTIVE MEMORY COMPLAINTS: Primary | ICD-10-CM

## 2020-07-29 RX ORDER — VENLAFAXINE 37.5 MG/1
TABLET ORAL
COMMUNITY
Start: 2020-07-02

## 2020-07-29 NOTE — PROGRESS NOTES
Leona Martinez is a 59 y.o. female . presents for Follow-up (memory loss)   . A 60 yo female patient with HTN, depression, HLD came for follow-up of memory problems. During her last visit, MRI of the brain was ordered. MRI showed only mild burden of microvascular ischemic changes. She continues to have her memory problems: She forgets things said the previous day. No problems with names. She is not currently driving. Denied difficulty finding places. She has some word finding difficulties when walking; she could not get her out this out. Independent in most ADLs. She has some difficulty walking from left knee pain. No weakness. Her MMSE score during her last visit was 30/30. Review of Systems   Constitutional: Positive for weight loss. Negative for chills and fever. HENT: Negative for hearing loss and tinnitus. Eyes: Positive for blurred vision. Negative for double vision. Respiratory: Negative for cough and shortness of breath. Cardiovascular: Negative for chest pain. Gastrointestinal: Negative for nausea and vomiting. Genitourinary: Negative for dysuria, frequency and urgency. Musculoskeletal: Positive for joint pain (left knee and left arm) and myalgias. Skin: Positive for itching. Negative for rash. Neurological: Positive for headaches (left periorbital). Negative for dizziness, tremors and focal weakness. Endo/Heme/Allergies: Does not bruise/bleed easily. Psychiatric/Behavioral: Positive for depression and memory loss. Negative for suicidal ideas.        Past Medical History:   Diagnosis Date    Adverse effect of anesthesia     Arthritis     Hyperlipemia     Hypertension     Other ill-defined conditions(799.89)     High cholesterol    Psychiatric disorder     Depression       Past Surgical History:   Procedure Laterality Date    BREAST SURGERY PROCEDURE UNLISTED      Clogged milk duct (surgical)    COLONOSCOPY N/A 7/2/2019    COLON[facility] by Sly Carter Janey Closs, [facility] at [facility] ENDOSCOPY    HX GYN      Hysterectomy    HX ORTHOPAEDIC      Broken hip        No family history on file. Social History     Socioeconomic History    Marital status: UNKNOWN     Spouse name: Not on file    Number of children: Not on file    Years of education: Not on file    Highest education level: Not on file   Occupational History    Not on file   Social Needs    Financial resource strain: Not on file    Food insecurity     Worry: Not on file     Inability: Not on file    Transportation needs     Medical: Not on file     Non-medical: Not on file   Tobacco Use    Smoking status: Former Smoker    Smokeless tobacco: Never Used   Substance and Sexual Activity    Alcohol use: Yes     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 [id number]    Drug use: No     Comment: In residential treatment    Sexual activity: Not on file   Lifestyle    Physical activity     Days per week: Not on file     Minutes per session: Not on file    Stress: Not on file   Relationships    Social connections     Talks on phone: Not on file     Gets together: Not on file     Attends Episcopalian service: Not on file     Active member of club or organization: Not on file     Attends meetings of clubs or organizations: Not on file     Relationship status: Not on file    Intimate partner violence     Fear of current or ex partner: Not on file     Emotionally abused: Not on file     Physically abused: Not on file     Forced sexual activity: Not on file   Other Topics Concern    Not on file   Social History Narrative    Not on file        Allergies   Allergen Reactions    Doxycycline Swelling    Pcn [Penicillins] Hives     PATIENT IS NOT ALLERGIC TO AMOXICLLIN. Current Outpatient Medications   Medication Sig Dispense Refill    diclofenac (Voltaren) 1 % gel Apply 2 g to affected area four (4) times daily. 100 g 2    pantoprazole (PROTONIX) 40 mg tablet Take 40 mg by mouth.       omega-3 fatty acids-vitamin e (FISH OIL) 1,000 mg Cap Take  by mouth.  calcium-vitamin D (OSCAL 250) 250-125 mg-unit tablet Take 1 Tab by mouth daily.  amlodipine (NORVASC) 10 mg tablet Take 10 mg by mouth daily.  lisinopril (PRINIVIL, ZESTRIL) 20 mg tablet Take 20 mg by mouth daily.  atorvastatin (LIPITOR) 20 mg tablet Take  by mouth daily.  OTHER abx for tooth      meloxicam (MOBIC) 15 mg tablet Take 1 Tab by mouth daily (with breakfast). 30 Tab 2    diclofenac EC (VOLTAREN) 50 mg EC tablet Take 1 Tab by mouth two (2) times daily (with meals). 180 Tab 2    valacyclovir (VALTREX) 500 mg tablet Take  by mouth two (2) times a day. Physical Exam  Constitutional:       Appearance: Normal appearance. HENT:      Head: Atraumatic. Mouth/Throat:      Mouth: Mucous membranes are moist.      Pharynx: No oropharyngeal exudate. Eyes:      Extraocular Movements: Extraocular movements intact. Neck:      Musculoskeletal: Neck supple. Pulmonary:      Effort: No respiratory distress. Musculoskeletal:         General: Tenderness present. Deformity: Left knee. Right lower leg: No edema. Left lower leg: No edema. Neurological:      Mental Status: She is alert. Comments: Mental status: Awake, alert, oriented x3, follows simple, complex and inverted commands, no neglect, no extinction to DSS or VSS, immediate recall 3/3 and delayed recall 2/3. Speech and languge: fluent, coherent, and comprehension intact  CN: VFF, EOMI, PERRLA, face sensation intact , no facial asymmetry noted, palate elevation symmetric bilat, SS+SCM 5/5 bilat, tongue midline  Motor: no pronator drift, tone normal throughout, strength 5/5 throughout except some limitation testing the left lower extremity from the knee pain. Sensory: intact to light touch and pinprick throughout  Coordination: FNF, HS accurate w/o dysmetria  DTR: 2+ throughout  Gait: Antalgic from the left knee pain.           No visits with results within 3 Month(s) from this visit. Latest known visit with results is:   Admission on 12/12/2010, Discharged on 12/12/2010   Component Date Value Ref Range Status    Group A Strep Ag ID 12/12/2010 NEGATIVE   NEGATIVE Final    Comment: PHYSICIAN PLEASE NOTE: A NEGATIVE STREPT SCREEN DOES NOT EXCLUDE A DIAGNOSIS                            OF STREPTOCOCCAL INFECTION. NEGATIVE SCREENS ARE FOLLOWED  BY A CULTURE. CULTURE RESULTS TO FOLLOW.  Influenza A Ag 12/12/2010 NEGATIVE   NEGATIVE Final    Comment: NEGATIVE FOR THE ANTIGEN. INFECTION DUE TO FLU A CANNOT BE RULED OUT. ANTIGEN IN THE SAMPLE MAY BE BELOW                           THE DETECTION LIMIT OF THE TEST. CULTURE OF NEGATIVE SAMPLES IS SUGGESTED.  Influenza B Ag 12/12/2010 NEGATIVE   NEGATIVE Final    Comment: NEGATIVE FOR FLU B PROTEIN ANTIGEN                           INFECTION DUE TO FLU B CANNOT BE RULED OUT                           FLU B ANTIGEN IN THE SAMPLE MAY BE BELOW                           THE DETECTION LIMIT OF THE TEST.  Specimen Description: 12/12/2010 THROAT   Final    Special Requests: 12/12/2010 NO SPECIAL REQUESTS   Final    Culture result: 12/12/2010 NO BETA STREP ISOLATED   Final    Report Status 12/12/2010 12/14/2010 FINAL   Final     Study Result     Brain MR without contrast     HISTORY: Difficulty with speech, subjective memory complaints.     COMPARISON: None     TECHNIQUE: Brain scanned with sagittal and axial T1W scans, axial T2W , axial  FLAIR, axial diffusion weighted images and SWAN.    FINDINGS:      Cerebral parenchyma: Mild burden of small T2 and FLAIR hyperintense cerebral  white matter lesions. No acute infarct.  No mass effect or mass lesion.     Brain volumes and ventricular system: Normal in size and morphology for the  patient's age.     Midline structures: Normal.     Cerebellum: Normal.     Brainstem: Minimal T2 and FLAIR hyperintense signal in the dorsal krista.     Vascular system: Expected arterial flow voids are present at the base of brain.     Calvarium and skull base: Normal.     Paranasal sinuses and mastoid air cells: Small mucous retention cysts in the  maxillary sinuses.     Visualized orbits: Intraocular lens replacements.     Visualized upper cervical spine: Unremarkable for a nondedicated exam.     IMPRESSION  IMPRESSION:     1. No acute brain abnormalities. 2.  Mild burden of cerebral white matter and pontine lesions, although  nonspecific, most likely chronic microvascular ischemic change.                ICD-10-CM ICD-9-CM    1. Subjective memory complaints  R41.89 780.93 REFERRAL TO NEUROPSYCHOLOGY       A 59 is old female patient here for follow-up evaluation of subjective memory loss. Last MMSE score was 30/30. Independent in ADL. Patient has word finding difficulties and occasionally forgets things done or said the previous day. .  She is independent in her ADLs. MRI of the brain is unremarkable. Patient has depression and is currently on venlafaxine and advised her to continue visit and follow-up with psychiatry. Possible pseudodementia. Referral to neuropsychology. We will see her in 6 months time.

## 2020-07-29 NOTE — PROGRESS NOTES
Ciaran Chavez is a 59 y.o. female is in the office today with a follow up on memory loss. 1. Have you been to the ER, urgent care clinic since your last visit? Hospitalized since your last visit? No    2. Have you seen or consulted any other health care providers outside of the [facility] since your last visit? Include any pap smears or colon screening.  No
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
## 2020-08-27 ENCOUNTER — OFFICE VISIT (OUTPATIENT)
Dept: NEUROLOGY | Age: 65
End: 2020-08-27

## 2020-08-27 DIAGNOSIS — R41.3 MEMORY LOSS: Primary | ICD-10-CM

## 2020-08-27 DIAGNOSIS — R41.840 ATTENTION AND CONCENTRATION DEFICIT: ICD-10-CM

## 2020-08-27 DIAGNOSIS — R45.89 SYMPTOMS OF DEPRESSION: ICD-10-CM

## 2020-08-27 DIAGNOSIS — F41.8 ANXIETY ABOUT HEALTH: ICD-10-CM

## 2020-09-03 ENCOUNTER — OFFICE VISIT (OUTPATIENT)
Dept: NEUROLOGY | Age: 65
End: 2020-09-03

## 2020-09-03 DIAGNOSIS — F41.9 ANXIETY DISORDER, UNSPECIFIED TYPE: ICD-10-CM

## 2020-09-03 DIAGNOSIS — Z91.49 HISTORY OF PSYCHOLOGICAL TRAUMA: ICD-10-CM

## 2020-09-03 DIAGNOSIS — F34.1 DYSTHYMIA: ICD-10-CM

## 2020-09-03 DIAGNOSIS — G31.84 MILD NEUROCOGNITIVE DISORDER: Primary | ICD-10-CM
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
## 2020-09-15 ENCOUNTER — VIRTUAL VISIT (OUTPATIENT)
Dept: NEUROLOGY | Age: 65
End: 2020-09-15

## 2020-09-15 DIAGNOSIS — Z91.49 HISTORY OF PSYCHOLOGICAL TRAUMA: ICD-10-CM

## 2020-09-15 DIAGNOSIS — F09 COGNITIVE DISORDER: Primary | ICD-10-CM

## 2020-09-15 DIAGNOSIS — F34.1 DYSTHYMIA: ICD-10-CM

## 2020-09-15 DIAGNOSIS — F41.9 ANXIETY DISORDER, UNSPECIFIED TYPE: ICD-10-CM
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
## 2020-09-22 ENCOUNTER — VIRTUAL VISIT (OUTPATIENT)
Dept: NEUROLOGY | Age: 65
End: 2020-09-22
Payer: MEDICARE

## 2020-09-22 DIAGNOSIS — R41.89 SUBJECTIVE MEMORY COMPLAINTS: Primary | ICD-10-CM

## 2020-09-22 PROCEDURE — 99441 PR PHYS/QHP TELEPHONE EVALUATION 5-10 MIN: CPT | Performed by: STUDENT IN AN ORGANIZED HEALTH CARE EDUCATION/TRAINING PROGRAM

## 2020-09-22 NOTE — PROGRESS NOTES
Roselia Palencia is a 59 y.o. female, evaluated via audio-only technology on 9/22/2020 for Referral Follow Up  . Assessment & Plan:   Diagnoses and all orders for this visit:    1. Subjective memory complaints    No significant cognitive dysfunction based on the neuropsych testing. Patient's anxiety and depression contributing to her subjective memory problems. Patient will follow-up with psychiatry. Might benefit from cognitive behavioral therapy. We will see counselors. We will see her as needed. 12  Subjective:   A 59years old female patient here for follow-up evaluation of memory problems. This is an audio encounter. She came today to discuss her neuropsychological test evaluation. She was seen by Dr. Helen Castillo and has discussed her test results. Based on the neuropsych test, patient does not have any significant cognitive dysfunctions. Possible anxiety/depression contributing to her subjective memory problems. She will be seeing counselors and will follow with psychiatry. She now feels much better after the neuropsych encounter. Neuropsychological test from 09/03/2020:     IMPRESSIONS/RECOMMENDATIONS:   Test results were reassuring from a cognitive perspective. Ms. Tiffanie De La Vega produced broadly average scores across domains. There were very mild indications of inattentiveness in the visual mode, but I do not see sufficient evidence for ADHD or any other cognitive disorder. However, this evaluation did reveal clinical levels of depression and anxiety, this despite pharmacotherapy. It is these unremitting psychological symptoms along with the recently diagnosed JARAD that are likely resulting in the cognitive symptoms reported by Ms. Torres.  Therefore, I would suggest psychotherapy as an adjunct to medication, compliance with CPAP regimen, and a brain fitness regimen (sleep hygiene, physician-approved level of exercise, healthy diet, mentally stimulating activities, minimal alcohol consumption, avoid nicotine). Due to Ms. Torres's risk factors for cognitive decline (HTN, family history of dementia), serial testing as indicated is suggested.      DIAGNOSTIC IMPRESSIONS:  1. Cognitive Disorder-NOT SUPPORTED  2. Anxiety Disorder, unspecified  3. Dysthymia  4. H/O Psychological Trauma         Prior to Admission medications    Medication Sig Start Date End Date Taking? Authorizing Provider   venlafaxine (EFFEXOR) 37.5 mg tablet TAKE 1 TABLET BY MOUTH ONCE A DAY. 7/2/20  Yes Provider, Historical   pantoprazole (PROTONIX) 40 mg tablet Take 40 mg by mouth. Yes Provider, Historical   omega-3 fatty acids-vitamin e (FISH OIL) 1,000 mg Cap Take  by mouth. Yes Other, MD Thom   calcium-vitamin D (OSCAL 250) 250-125 mg-unit tablet Take 1 Tab by mouth daily. Yes Other, MD Thom   amlodipine (NORVASC) 10 mg tablet Take 10 mg by mouth daily. Yes Steven, MD Thom   lisinopril (PRINIVIL, ZESTRIL) 20 mg tablet Take 20 mg by mouth daily. Yes Other, MD Thom   atorvastatin (LIPITOR) 20 mg tablet Take  by mouth daily. Yes Other, MD Thom   diclofenac (Voltaren) 1 % gel Apply 2 g to affected area four (4) times daily. 4/7/20   Susy Ko MD   meloxicam (MOBIC) 15 mg tablet Take 1 Tab by mouth daily (with breakfast). 2/20/19   ALEX Roth   diclofenac EC (VOLTAREN) 50 mg EC tablet Take 1 Tab by mouth two (2) times daily (with meals). 2/1/19   Susy Ko MD   valacyclovir (VALTREX) 500 mg tablet Take  by mouth two (2) times a day. Other, MD Thom           Patient-Reported Vitals 9/22/2020   Patient-Reported Weight 171.7lb   Patient-Reported Pulse 83   Patient-Reported Systolic  356   Patient-Reported Diastolic 80        Miguel Krueger, who was evaluated through a patient-initiated, synchronous (real-time) audio only encounter, and/or her healthcare decision maker, is aware that it is a billable service, with coverage as determined by her insurance carrier.  She provided verbal consent to proceed: Yes. She has not had a related appointment within my department in the past 7 days or scheduled within the next 24 hours.       Total Time: minutes: 5-10 minutes    Cheyanne Richardson MD

## 2020-10-20 ENCOUNTER — TELEPHONE (OUTPATIENT)
Dept: ORTHOPEDIC SURGERY | Age: 65
End: 2020-10-20

## 2020-10-20 DIAGNOSIS — M17.11 PRIMARY OSTEOARTHRITIS OF RIGHT KNEE: ICD-10-CM

## 2020-10-20 DIAGNOSIS — M17.12 PRIMARY OSTEOARTHRITIS OF LEFT KNEE: Primary | ICD-10-CM

## 2020-10-20 NOTE — TELEPHONE ENCOUNTER
Patient came by the [facility] to schedule appt for HA injections and to discuss her shoulder. I advised that I would make her an appt to discuss the HA injections and patient requested an auth be put in prior to appt.

## 2020-10-26 ENCOUNTER — TELEPHONE (OUTPATIENT)
Dept: ORTHOPEDIC SURGERY | Age: 65
End: 2020-10-26

## 2020-10-26 NOTE — TELEPHONE ENCOUNTER
Patient called to cancel tomorrow's appt and declined to schedule for an injection until we can confirm that she can get a steroid only injection. Please advise patient at [phone].

## 2020-10-27 DIAGNOSIS — M17.12 PRIMARY OSTEOARTHRITIS OF LEFT KNEE: ICD-10-CM

## 2020-10-27 DIAGNOSIS — M17.11 PRIMARY OSTEOARTHRITIS OF RIGHT KNEE: ICD-10-CM

## 2020-10-28 NOTE — TELEPHONE ENCOUNTER
Called and spoke to patient. Made her aware that steroid injections don't require authorization, however, the HA injections will require authorization. Appt was made for 11/4/2020 at 130pm with Dr Ana Mckinley.

## 2020-10-28 NOTE — TELEPHONE ENCOUNTER
Patient is still waiting for a return call regarding previous message. Please call as soon as possible.

## 2020-11-04 ENCOUNTER — OFFICE VISIT (OUTPATIENT)
Dept: ORTHOPEDIC SURGERY | Age: 65
End: 2020-11-04
Payer: MEDICARE

## 2020-11-04 VITALS
OXYGEN SATURATION: 98 % | HEART RATE: 90 BPM | TEMPERATURE: 96.6 F | WEIGHT: 174.2 LBS | DIASTOLIC BLOOD PRESSURE: 73 MMHG | BODY MASS INDEX: 27.34 KG/M2 | HEIGHT: 67 IN | RESPIRATION RATE: 16 BRPM | SYSTOLIC BLOOD PRESSURE: 119 MMHG

## 2020-11-04 DIAGNOSIS — M17.11 PRIMARY OSTEOARTHRITIS OF RIGHT KNEE: Primary | ICD-10-CM

## 2020-11-04 PROCEDURE — G8419 CALC BMI OUT NRM PARAM NOF/U: HCPCS | Performed by: ORTHOPAEDIC SURGERY

## 2020-11-04 PROCEDURE — 3017F COLORECTAL CA SCREEN DOC REV: CPT | Performed by: ORTHOPAEDIC SURGERY

## 2020-11-04 PROCEDURE — 1100F PTFALLS ASSESS-DOCD GE2>/YR: CPT | Performed by: ORTHOPAEDIC SURGERY

## 2020-11-04 PROCEDURE — G8510 SCR DEP NEG, NO PLAN REQD: HCPCS | Performed by: ORTHOPAEDIC SURGERY

## 2020-11-04 PROCEDURE — 99214 OFFICE O/P EST MOD 30 MIN: CPT | Performed by: ORTHOPAEDIC SURGERY

## 2020-11-04 PROCEDURE — G8428 CUR MEDS NOT DOCUMENT: HCPCS | Performed by: ORTHOPAEDIC SURGERY

## 2020-11-04 PROCEDURE — 20610 DRAIN/INJ JOINT/BURSA W/O US: CPT | Performed by: ORTHOPAEDIC SURGERY

## 2020-11-04 PROCEDURE — 3288F FALL RISK ASSESSMENT DOCD: CPT | Performed by: ORTHOPAEDIC SURGERY

## 2020-11-04 PROCEDURE — 1090F PRES/ABSN URINE INCON ASSESS: CPT | Performed by: ORTHOPAEDIC SURGERY

## 2020-11-04 PROCEDURE — G8400 PT W/DXA NO RESULTS DOC: HCPCS | Performed by: ORTHOPAEDIC SURGERY

## 2020-11-04 PROCEDURE — G8536 NO DOC ELDER MAL SCRN: HCPCS | Performed by: ORTHOPAEDIC SURGERY

## 2020-11-04 RX ORDER — HYALURONATE SODIUM 30 MG/2 ML
30 SYRINGE (ML) INTRAARTICULAR ONCE
Qty: 1 SYRINGE | Refills: 0
Start: 2020-11-04 | End: 2020-11-04

## 2020-11-04 NOTE — PROGRESS NOTES
Patient: Manjula Blake                MRN: [id number]       SSN: [id number]  YOB: 1955        AGE: 72 y.o. SEX: female  Body mass index is 27.7 kg/m². PCP: Yo Benedict MD  11/04/20    Chief Complaint: Right knee pain    HPI: Manjula Blake is a 72 y.o. female patient who comes to the office with right knee pain. She continues to have right knee pain despite conservative management. She describes a daily achy pain in her right knee. She has some posterior knee pain as well as anterior medial knee pain. She also reports catching at times. Today she says her symptoms are not severe but she also notices swelling. She has had cortisone injections in the past which have not given her much in the way of relief. Past Medical History:   Diagnosis Date    Adverse effect of anesthesia     Arthritis     Hyperlipemia     Hypertension     Other ill-defined conditions(609.55)     High cholesterol    Psychiatric disorder     Depression       No family history on file. Current Outpatient Medications   Medication Sig Dispense Refill    venlafaxine (EFFEXOR) 37.5 mg tablet TAKE 1 TABLET BY MOUTH ONCE A DAY.  diclofenac (Voltaren) 1 % gel Apply 2 g to affected area four (4) times daily. 100 g 2    pantoprazole (PROTONIX) 40 mg tablet Take 40 mg by mouth.  meloxicam (MOBIC) 15 mg tablet Take 1 Tab by mouth daily (with breakfast). 30 Tab 2    diclofenac EC (VOLTAREN) 50 mg EC tablet Take 1 Tab by mouth two (2) times daily (with meals). 180 Tab 2    omega-3 fatty acids-vitamin e (FISH OIL) 1,000 mg Cap Take  by mouth.  calcium-vitamin D (OSCAL 250) 250-125 mg-unit tablet Take 1 Tab by mouth daily.  amlodipine (NORVASC) 10 mg tablet Take 10 mg by mouth daily.  lisinopril (PRINIVIL, ZESTRIL) 20 mg tablet Take 20 mg by mouth daily.  atorvastatin (LIPITOR) 20 mg tablet Take  by mouth daily.       valacyclovir (VALTREX) 500 mg tablet Take  by mouth two (2) times a day. Allergies   Allergen Reactions    Doxycycline Swelling    Pcn [Penicillins] Hives     PATIENT IS NOT ALLERGIC TO AMOXICLLIN.        Past Surgical History:   Procedure Laterality Date    BREAST SURGERY PROCEDURE UNLISTED      Clogged milk duct (surgical)    COLONOSCOPY N/A 7/2/2019    COLONOSCOPY [facility] by Severa Bering, [facility] at [facility] ENDOSCOPY    HX GYN      Hysterectomy    HX ORTHOPAEDIC      Broken hip       Social History     Socioeconomic History    Marital status: UNKNOWN     Spouse name: Not on file    Number of children: Not on file    Years of education: Not on file    Highest education level: Not on file   Occupational History    Not on file   Social Needs    Financial resource strain: Not on file    Food insecurity     Worry: Not on file     Inability: Not on file    Transportation needs     Medical: Not on file     Non-medical: Not on file   Tobacco Use    Smoking status: Former Smoker    Smokeless tobacco: Never Used   Substance and Sexual Activity    Alcohol use: Yes     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 [id number]    Drug use: No     Comment: In residential treatment    Sexual activity: Not on file   Lifestyle    Physical activity     Days per week: Not on file     Minutes per session: Not on file    Stress: Not on file   Relationships    Social connections     Talks on phone: Not on file     Gets together: Not on file     Attends Ep[employer] service: Not on file     Active member of club or organization: Not on file     Attends meetings of clubs or organizations: Not on file     Relationship status: Not on file    Intimate partner violence     Fear of current or ex partner: Not on file     Emotionally abused: Not on file     Physically abused: Not on file     Forced sexual activity: Not on file   Other Topics Concern    Not on file   Social History Narrative    Not on file       REVIEW OF SYSTEMS:      No changes from previous review of systems unless noted.    PHYSICAL EXAMINATION:  Visit Vitals  /73 (BP 1 Location: Left arm, BP Patient Position: Sitting)   Pulse 90   Temp (!) 96.6 °F (35.9 °C) (Temporal)   Resp 16   Ht 5' 6.5\" (1.689 m)   Wt 174 lb 3.2 oz (79 kg)   SpO2 98%   BMI 27.70 kg/m²     Body mass index is 27.7 kg/m². GENERAL: Alert and oriented x3, in no acute distress. HEENT: Normocephalic, atraumatic. RESP: Non labored breathing. SKIN: No rashes or lesions noted. Knee Examination      R   L  Effusion   -   -  Warmth   -   -  Erythema   -   -  ROM   Extension  Full   Full   Flexion   Full   Full  Tenderness   Medial Joint  +   -   Lateral Joint  -   -   Posterior knee  +   -  Strength   Quad   5   5   Hamstring  5   5  Crepitus   Tibiofemoral   -   -   Patellofemoral  -   -  Instability   Anterior  -   -   Posterior  -   -   Patellofemoral  -   -  Lachman's   -   -  Anterior Drawer  -   -  Posterior Drawer  -   -  Andres's   Pain   -   -   Locking  -   -  Calf TTP   -   -  Straight Leg Raise  -   -      IMAGING:  Previous x-rays of the right knee were reviewed in the office which show mild right knee osteoarthritis    ASSESSMENT & PLAN  Diagnosis: Right knee osteoarthritis    Stanley Gonzalez has had several treatments for the right knee which have been unsuccessful alleviating her pain. She rates it as 8 out of 10 today. We discussed several treatment options. She would like to try hyaluronic acid injections into the right knee. We will start that today. We will also put an order for rolling walker for her. Follow-up in a week for the next injection.     [facility]  OFFICE PROCEDURE PROGRESS NOTE        Chart reviewed for the following:   IManinder MD, have reviewed the History, Physical and updated the Allergic reactions for Isaac      TIME OUT performed immediately prior to start of procedure:   Ayan Reyes MD, have performed the following reviews on Isaac  prior to the start of the procedure:            * Patient was identified by name and date of birth   * Agreement on procedure being performed was verified  * Risks and Benefits explained to the patient  * Procedure site verified and marked as necessary  * Patient was positioned for comfort  * Consent was signed and verified    Time: 1:47 PM    Location: Right knee    Orthovisc 30mg (2cc)    Date of procedure: 11/4/2020    Procedure performed by:  Sophie Silva MD    Provider assisted by: Wang Hutchinson LPN    Patient assisted by: self    How tolerated by patient: tolerated the procedure well with no complications    Post Procedural Pain Scale: 0 - No Hurt    Comments: none                  Electronically signed by: Sophie Silva MD

## 2020-11-11 ENCOUNTER — TELEPHONE (OUTPATIENT)
Dept: ORTHOPEDIC SURGERY | Age: 65
End: 2020-11-11

## 2020-11-11 NOTE — TELEPHONE ENCOUNTER
She can take OTC NSAIDs to help with pain, ice or heat whichever helps more. Sometimes it takes the full series and a couple weeks before pts see results.

## 2020-11-11 NOTE — TELEPHONE ENCOUNTER
Patient called stating the injection she received on 11/04 is not working and she is still in pain. She states she can hardly get up out of bed. She is wondering what she can do for the pain, and if she will need a different type of injection. Please advise patient: [phone].

## 2020-11-13 ENCOUNTER — OFFICE VISIT (OUTPATIENT)
Dept: ORTHOPEDIC SURGERY | Age: 65
End: 2020-11-13
Payer: MEDICARE

## 2020-11-13 VITALS
WEIGHT: 176.2 LBS | HEIGHT: 67 IN | TEMPERATURE: 96.6 F | SYSTOLIC BLOOD PRESSURE: 109 MMHG | HEART RATE: 90 BPM | OXYGEN SATURATION: 99 % | BODY MASS INDEX: 27.65 KG/M2 | DIASTOLIC BLOOD PRESSURE: 71 MMHG

## 2020-11-13 DIAGNOSIS — M17.11 PRIMARY OSTEOARTHRITIS OF RIGHT KNEE: Primary | ICD-10-CM

## 2020-11-13 PROCEDURE — 20610 DRAIN/INJ JOINT/BURSA W/O US: CPT | Performed by: ORTHOPAEDIC SURGERY

## 2020-11-13 RX ORDER — DICLOFENAC SODIUM 10 MG/G
2 GEL TOPICAL 4 TIMES DAILY
Qty: 100 G | Refills: 2 | Status: SHIPPED | OUTPATIENT
Start: 2020-11-13 | End: 2021-01-27

## 2020-11-13 NOTE — PROGRESS NOTES
Patient: Manuel Hidalgo                MRN: [id number]       SSN: [id number]  YOB: 1955        AGE: 72 y.o. SEX: female  Body mass index is 28.01 kg/m². PCP: Bhavna Aranda MD  11/13/20    Chief Complaint: Right knee   Pain 10/10    HPI: Manuel Hidalgo is a 72 y.o. female patient who returns to the office today for her right knee. She is here today for Orthovisc injection #2 into the right knee. She says she did not notice much in the way of pain relief since the first injection. No new complaints. Past Medical History:   Diagnosis Date    Adverse effect of anesthesia     Arthritis     Hyperlipemia     Hypertension     Other ill-defined conditions(189.74)     High cholesterol    Psychiatric disorder     Depression       No family history on file. Current Outpatient Medications   Medication Sig Dispense Refill    diclofenac (VOLTAREN) 1 % gel Apply 2 g to affected area four (4) times daily. 100 g 2    venlafaxine (EFFEXOR) 37.5 mg tablet TAKE 1 TABLET BY MOUTH ONCE A DAY.  diclofenac (Voltaren) 1 % gel Apply 2 g to affected area four (4) times daily. 100 g 2    pantoprazole (PROTONIX) 40 mg tablet Take 40 mg by mouth.  meloxicam (MOBIC) 15 mg tablet Take 1 Tab by mouth daily (with breakfast). 30 Tab 2    diclofenac EC (VOLTAREN) 50 mg EC tablet Take 1 Tab by mouth two (2) times daily (with meals). 180 Tab 2    omega-3 fatty acids-vitamin e (FISH OIL) 1,000 mg Cap Take  by mouth.  calcium-vitamin D (OSCAL 250) 250-125 mg-unit tablet Take 1 Tab by mouth daily.  amlodipine (NORVASC) 10 mg tablet Take 10 mg by mouth daily.  lisinopril (PRINIVIL, ZESTRIL) 20 mg tablet Take 20 mg by mouth daily.  atorvastatin (LIPITOR) 20 mg tablet Take  by mouth daily.  valacyclovir (VALTREX) 500 mg tablet Take  by mouth two (2) times a day.          Allergies   Allergen Reactions    Doxycycline Swelling    Pcn [Penicillins] Hives     PATIENT IS NOT ALLERGIC TO AMOXICLLIN. Past Surgical History:   Procedure Laterality Date    BREAST SURGERY PROCEDURE UNLISTED      Clogged milk duct (surgical)    COLONOSCOPY N/A 7/2/2019    COL[facility] performed by Chantell Sanford MD at [facility] ENDOSCOPY    HX GYN      Hysterectomy    HX ORTHOPAEDIC      Broken hip       Social History     Socioeconomic History    Marital status: UNKNOWN     Spouse name: Not on file    Number of children: Not on file    Years of education: Not on file    Highest education level: Not on file   Occupational History    Not on file   Social Needs    Financial resource strain: Not on file    Food insecurity     Worry: Not on file     Inability: Not on file    Transportation needs     Medical: Not on file     Non-medical: Not on file   Tobacco Use    Smoking status: Former Smoker    Smokeless tobacco: Never Used   Substance and Sexual Activity    Alcohol use: Yes     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 [id number]    Drug use: No     Comment: In residential treatment    Sexual activity: Not on file   Lifestyle    Physical activity     Days per week: Not on file     Minutes per session: Not on file    Stress: Not on file   Relationships    Social connections     Talks on phone: Not on file     Gets together: Not on file     Attends Restorationist service: Not on file     Active member of club or organization: Not on file     Attends meetings of clubs or organizations: Not on file     Relationship status: Not on file    Intimate partner violence     Fear of current or ex partner: Not on file     Emotionally abused: Not on file     Physically abused: Not on file     Forced sexual activity: Not on file   Other Topics Concern    Not on file   Social History Narrative    Not on file       REVIEW OF SYSTEMS:      No changes from previous review of systems unless noted.     PHYSICAL EXAMINATION:  Visit Vitals  /71 (BP 1 Location: Left arm, BP Patient Position: Sitting)   Pulse 90   Temp (!) 96.6 °F (35.9 °C) (Temporal)   Ht 5' 6.5\" (1.689 m)   Wt 176 lb 3.2 oz (79.9 kg)   SpO2 99%   BMI 28.01 kg/m²     Body mass index is 28.01 kg/m². GENERAL: Alert and oriented x3, in no acute distress. HEENT: Normocephalic, atraumatic. RESP: Non labored breathing. SKIN: No rashes or lesions noted. Knee Examination      R   L  Effusion   -   -  Warmth   -   -  Erythema   -   -  ROM   Extension  Full   Full   Flexion   Full   Full  Tenderness   Medial Joint  +   -   Lateral Joint  +   -   Posterior knee  -   -  Strength   Quad   5   5   Hamstring  5   5  Crepitus   Tibiofemoral   -   -   Patellofemoral  -   -  Instability   Anterior  -   -   Posterior  -   -   Patellofemoral  -   -  Lachman's   -   -  Anterior Drawer  -   -  Posterior Drawer  -   -  Andres's   Pain   -   -   Locking  -   -  Calf TTP   -   -  Straight Leg Raise  -   -      IMAGING:  No new imaging today    ASSESSMENT & PLAN  Diagnosis: Right knee osteoarthritis    Karen Joya is here for Orthovisc injection #2 into the right knee. She tolerated it well.   Follow-up in 1 week for #3    [facility]  OFFICE PROCEDURE PROGRESS NOTE        Chart reviewed for the following:   Liu Mcclain MD, have reviewed the History, Physical and updated the Allergic reactions for Lum Willi     TIME OUT performed immediately prior to start of procedure:   Liu Mcclain MD, have performed the following reviews on Lum Willi prior to the start of the procedure:            * Patient was identified by name and date of birth   * Agreement on procedure being performed was verified  * Risks and Benefits explained to the patient  * Procedure site verified and marked as necessary  * Patient was positioned for comfort  * Consent was signed and verified    Time: 1:17 PM    Location: Right knee     Orthovisc 30mg (2cc)    Date of procedure: 11/13/2020    Procedure performed by:  Paul Restrepo MD    Provider assisted by: Chloe Vidal LPN    Patient assisted by: self    How tolerated by patient: tolerated the procedure well with no complications    Post Procedural Pain Scale: 0 - No Hurt    Comments: none                  Electronically signed by: Alberto White MD

## 2020-11-19 ENCOUNTER — TELEPHONE (OUTPATIENT)
Dept: ORTHOPEDIC SURGERY | Age: 65
End: 2020-11-19

## 2020-11-19 RX ORDER — IBUPROFEN 800 MG/1
800 TABLET ORAL
Qty: 120 TAB | Refills: 2 | Status: SHIPPED | OUTPATIENT
Start: 2020-11-19 | End: 2021-03-18

## 2020-11-19 NOTE — TELEPHONE ENCOUNTER
Patient called tearful because of her pain and is asking for Ibuprofen 800 mg. Please advise.      [address] and [place]  Patient [phone]

## 2020-11-20 ENCOUNTER — OFFICE VISIT (OUTPATIENT)
Dept: ORTHOPEDIC SURGERY | Age: 65
End: 2020-11-20
Payer: MEDICARE

## 2020-11-20 VITALS
HEART RATE: 91 BPM | BODY MASS INDEX: 28.06 KG/M2 | WEIGHT: 174.6 LBS | RESPIRATION RATE: 16 BRPM | SYSTOLIC BLOOD PRESSURE: 117 MMHG | DIASTOLIC BLOOD PRESSURE: 74 MMHG | HEIGHT: 66 IN | TEMPERATURE: 94.2 F | OXYGEN SATURATION: 99 %

## 2020-11-20 DIAGNOSIS — M17.11 PRIMARY OSTEOARTHRITIS OF RIGHT KNEE: Primary | ICD-10-CM

## 2020-11-20 PROCEDURE — 99213 OFFICE O/P EST LOW 20 MIN: CPT | Performed by: ORTHOPAEDIC SURGERY

## 2020-11-20 PROCEDURE — 1090F PRES/ABSN URINE INCON ASSESS: CPT | Performed by: ORTHOPAEDIC SURGERY

## 2020-11-20 PROCEDURE — G8419 CALC BMI OUT NRM PARAM NOF/U: HCPCS | Performed by: ORTHOPAEDIC SURGERY

## 2020-11-20 PROCEDURE — G8428 CUR MEDS NOT DOCUMENT: HCPCS | Performed by: ORTHOPAEDIC SURGERY

## 2020-11-20 PROCEDURE — 1101F PT FALLS ASSESS-DOCD LE1/YR: CPT | Performed by: ORTHOPAEDIC SURGERY

## 2020-11-20 PROCEDURE — G8536 NO DOC ELDER MAL SCRN: HCPCS | Performed by: ORTHOPAEDIC SURGERY

## 2020-11-20 PROCEDURE — 3017F COLORECTAL CA SCREEN DOC REV: CPT | Performed by: ORTHOPAEDIC SURGERY

## 2020-11-20 PROCEDURE — G8510 SCR DEP NEG, NO PLAN REQD: HCPCS | Performed by: ORTHOPAEDIC SURGERY

## 2020-11-20 PROCEDURE — G8400 PT W/DXA NO RESULTS DOC: HCPCS | Performed by: ORTHOPAEDIC SURGERY

## 2020-11-20 PROCEDURE — 20610 DRAIN/INJ JOINT/BURSA W/O US: CPT | Performed by: ORTHOPAEDIC SURGERY

## 2020-11-20 PROCEDURE — 73562 X-RAY EXAM OF KNEE 3: CPT | Performed by: ORTHOPAEDIC SURGERY

## 2020-11-20 NOTE — PROGRESS NOTES
Patient: Alessandra Vaca                MRN: [id number]       SSN: [id number]  YOB: 1955        AGE: 72 y.o. SEX: female  Body mass index is 28.18 kg/m². PCP: Lani Lara MD  11/20/20    Chief Complaint: Right knee pain    Pain 8/10    HPI: Alessandra Vaca is a 72 y.o. female patient who returns today for her right knee. Since her last visit a week ago she had an increase in her right knee pain that she rates as 8 out of 10 today. This required ibuprofen 800 mg to be called in. She describes the pain mostly over the medial side of the knee as well as some swelling. Past Medical History:   Diagnosis Date    Adverse effect of anesthesia     Arthritis     Hyperlipemia     Hypertension     Other ill-defined conditions(179.89)     High cholesterol    Psychiatric disorder     Depression       No family history on file. Current Outpatient Medications   Medication Sig Dispense Refill    ibuprofen (MOTRIN) 800 mg tablet Take 1 Tab by mouth three (3) times daily (with meals). 120 Tab 2    diclofenac (VOLTAREN) 1 % gel Apply 2 g to affected area four (4) times daily. 100 g 2    venlafaxine (EFFEXOR) 37.5 mg tablet TAKE 1 TABLET BY MOUTH ONCE A DAY.  diclofenac (Voltaren) 1 % gel Apply 2 g to affected area four (4) times daily. 100 g 2    pantoprazole (PROTONIX) 40 mg tablet Take 40 mg by mouth.  meloxicam (MOBIC) 15 mg tablet Take 1 Tab by mouth daily (with breakfast). 30 Tab 2    diclofenac EC (VOLTAREN) 50 mg EC tablet Take 1 Tab by mouth two (2) times daily (with meals). 180 Tab 2    omega-3 fatty acids-vitamin e (FISH OIL) 1,000 mg Cap Take  by mouth.  calcium-vitamin D (OSCAL 250) 250-125 mg-unit tablet Take 1 Tab by mouth daily.  amlodipine (NORVASC) 10 mg tablet Take 10 mg by mouth daily.  lisinopril (PRINIVIL, ZESTRIL) 20 mg tablet Take 20 mg by mouth daily.  atorvastatin (LIPITOR) 20 mg tablet Take  by mouth daily.       valacyclovir (VALTREX) 500 mg tablet Take  by mouth two (2) times a day. Current Facility-Administered Medications   Medication Dose Route Frequency Provider Last Rate Last Dose    sodium hyaluronate (viscosup) (ORTHOVISC) 30 mg/2 mL injection syrg 30 mg  30 mg Intra artICUlar ONCE Morteza Bhakta MD           Allergies   Allergen Reactions    Doxycycline Swelling    Pcn [Penicillins] Hives     PATIENT IS NOT ALLERGIC TO AMOXICLLIN.        Past Surgical History:   Procedure Laterality Date    BREAST SURGERY PROCEDURE UNLISTED      Clogged milk duct (surgical)    COLONOSCOPY N/A 7/2/2019    [facility] Matt [facility]cc[facility] END[facility]COPY    HX GYN      Hysterectomy    HX ORTHOPAEDIC      Broken hip       Social History     Socioeconomic History    Marital status: UNKNOWN     Spouse name: Not on file    Number of children: Not on file    Years of education: Not on file    Highest education level: Not on file   Occupational History    Not on file   Social Needs    Financial resource strain: Not on file    Food insecurity     Worry: Not on file     Inability: Not on file    Transportation needs     Medical: Not on file     Non-medical: Not on file   Tobacco Use    Smoking status: Former Smoker    Smokeless tobacco: Never Used   Substance and Sexual Activity    Alcohol use: Yes     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 [id number]    Drug use: No     Comment: In residential treatment    Sexual activity: Not on file   Lifestyle    Physical activity     Days per week: Not on file     Minutes per session: Not on file    Stress: Not on file   Relationships    Social connections     Talks on phone: Not on file     Gets together: Not on file     Attends Latter day service: Not on file     Active member of club or organization: Not on file     Attends meetings of clubs or organizations: Not on file     Relationship status: Not on file    Intimate partner violence     Fear of current or ex partner: Not on file     Emotionally abused: Not on file     Physically abused: Not on file     Forced sexual activity: Not on file   Other Topics Concern    Not on file   Social History Narrative    Not on file       REVIEW OF SYSTEMS:      No changes from previous review of systems unless noted. PHYSICAL EXAMINATION:  Visit Vitals  /74 (BP 1 Location: Left arm, BP Patient Position: Sitting)   Pulse 91   Temp (!) 94.2 °F (34.6 °C) (Temporal)   Resp 16   Ht 5' 6\" (1.676 m)   Wt 174 lb 9.6 oz (79.2 kg)   SpO2 99%   BMI 28.18 kg/m²     Body mass index is 28.18 kg/m². GENERAL: Alert and oriented x3, in no acute distress. HEENT: Normocephalic, atraumatic. RESP: Non labored breathing. SKIN: No rashes or lesions noted. Knee Examination      R   L  Effusion   +   -  Warmth   -   -  Erythema   -   -  ROM   Extension  Full   Full   Flexion   Full   Full  Tenderness   Medial Joint  +   -   Lateral Joint  -   -   Posterior knee  -   -  Strength   Quad   5   5   Hamstring  5   5  Crepitus   Tibiofemoral   +   -   Patellofemoral  -   -  Instability   Anterior  -   -   Posterior  -   -   Patellofemoral  -   -  Lachman's   -   -  Anterior Drawer  -   -  Posterior Drawer  -   -  Andres's   Pain   +   -   Locking  -   -  Calf TTP   -   -  Straight Leg Raise  -   -      IMAGING:  X-rays of the right knee 3 views were taken in the office today. These do not show any fractures. Do not appreciate any acute changes from previous x-rays. She has mild medial knee arthrosis. ASSESSMENT & PLAN  Diagnosis: Right knee osteoarthritis    Shilo Zheng has had an increase in her right knee pain since her last visit. She may have had an reaction to the medication injected although she would like to continue on with third shot today. We will continue with the ibuprofen. She has an appointment in a few weeks for her shoulder I will check on her knee at that time.   If her symptoms persist we may need to get an MRI as she does seem to have some meniscal pain today.     [facility]  OFFICE PROCEDURE PROGRESS NOTE        Chart reviewed for the following:   Kya Guzman MD, have reviewed the History, Physical and updated the Allergic reactions for Tapan Morales     TIME OUT performed immediately prior to start of procedure:   Kya Guzman MD, have performed the following reviews on Tapan Morales prior to the start of the procedure:            * Patient was identified by name and date of birth   * Agreement on procedure being performed was verified  * Risks and Benefits explained to the patient  * Procedure site verified and marked as necessary  * Patient was positioned for comfort  * Consent was signed and verified    Time: 1:19 PM    Location: Right knee    Orthovisc 30mg (2cc)    Date of procedure: 11/20/2020    Procedure performed by:  Reyes Brewster, MD    Provider assisted by: Anya Cortez LPN    Patient assisted by: self    How tolerated by patient: tolerated the procedure well with no complications    Post Procedural Pain Scale: 0 - No Hurt    Comments: none                  Electronically signed by: Reyes Brewster, MD

## 2020-11-27 ENCOUNTER — TELEPHONE (OUTPATIENT)
Dept: ORTHOPEDIC SURGERY | Age: 65
End: 2020-11-27

## 2020-11-27 DIAGNOSIS — M17.11 OSTEOARTHRITIS OF RIGHT KNEE, UNSPECIFIED OSTEOARTHRITIS TYPE: Primary | ICD-10-CM

## 2020-11-27 NOTE — TELEPHONE ENCOUNTER
Patient contacted and told that Dr Burt Flaherty and Leticia Alves are out of the office and I gave her the information provided by Darlin Randolph. She wanted to know also if maybe doing PT would help her. I told her that I would send this message to Leticia Alves to answer upon her return regarding both flexeril and PT.

## 2020-11-27 NOTE — TELEPHONE ENCOUNTER
Recommend continue ibuprofen. Patient may take Tylenol per 's recommendations for breakthrough pain. No Flexeril recommended at this time.

## 2020-11-27 NOTE — TELEPHONE ENCOUNTER
Patient called stating she \"knows she is supposed to take ibuprofen (MOTRIN) 800 mg tablet with food, but wakes up at around 2 a.m. from the pain and does not want to eat that early due to eating breakfast at 5 or 6 a. m. \" She is inquiring about getting prescribed \"Cyclobenzaprine 10 mg. Instead because it can be taken at night\"  She states she \"heard it can help but has never taken it before. \"    Please advise [phone].

## 2021-01-27 DIAGNOSIS — M17.11 PRIMARY OSTEOARTHRITIS OF RIGHT KNEE: ICD-10-CM

## 2021-01-27 RX ORDER — DICLOFENAC SODIUM 10 MG/G
GEL TOPICAL
Qty: 300 G | Refills: 2 | Status: SHIPPED | OUTPATIENT
Start: 2021-01-27

## 2021-03-18 RX ORDER — IBUPROFEN 800 MG/1
TABLET ORAL
Qty: 120 TAB | Refills: 2 | Status: SHIPPED | OUTPATIENT
Start: 2021-03-18 | End: 2021-07-31

## 2021-07-31 RX ORDER — IBUPROFEN 800 MG/1
TABLET ORAL
Qty: 120 TABLET | Refills: 2 | Status: SHIPPED | OUTPATIENT
Start: 2021-07-31 | End: 2021-12-27

## 2021-12-27 RX ORDER — IBUPROFEN 800 MG/1
TABLET ORAL
Qty: 120 TABLET | Refills: 2 | Status: SHIPPED | OUTPATIENT
Start: 2021-12-27 | End: 2022-05-01

## 2022-04-14 ENCOUNTER — OFFICE VISIT (OUTPATIENT)
Dept: ORTHOPEDIC SURGERY | Age: 67
End: 2022-04-14
Payer: MEDICARE

## 2022-04-14 VITALS
TEMPERATURE: 96.8 F | HEIGHT: 66 IN | BODY MASS INDEX: 29.09 KG/M2 | OXYGEN SATURATION: 95 % | RESPIRATION RATE: 16 BRPM | HEART RATE: 75 BPM | WEIGHT: 181 LBS

## 2022-04-14 DIAGNOSIS — G89.29 CHRONIC PAIN OF LEFT KNEE: ICD-10-CM

## 2022-04-14 DIAGNOSIS — M25.562 CHRONIC PAIN OF LEFT KNEE: ICD-10-CM

## 2022-04-14 DIAGNOSIS — M17.12 PRIMARY OSTEOARTHRITIS OF LEFT KNEE: Primary | ICD-10-CM

## 2022-04-14 PROCEDURE — G8400 PT W/DXA NO RESULTS DOC: HCPCS | Performed by: PHYSICIAN ASSISTANT

## 2022-04-14 PROCEDURE — 1090F PRES/ABSN URINE INCON ASSESS: CPT | Performed by: PHYSICIAN ASSISTANT

## 2022-04-14 PROCEDURE — G8427 DOCREV CUR MEDS BY ELIG CLIN: HCPCS | Performed by: PHYSICIAN ASSISTANT

## 2022-04-14 PROCEDURE — 99213 OFFICE O/P EST LOW 20 MIN: CPT | Performed by: PHYSICIAN ASSISTANT

## 2022-04-14 PROCEDURE — 1101F PT FALLS ASSESS-DOCD LE1/YR: CPT | Performed by: PHYSICIAN ASSISTANT

## 2022-04-14 PROCEDURE — 73562 X-RAY EXAM OF KNEE 3: CPT | Performed by: PHYSICIAN ASSISTANT

## 2022-04-14 PROCEDURE — 3017F COLORECTAL CA SCREEN DOC REV: CPT | Performed by: PHYSICIAN ASSISTANT

## 2022-04-14 PROCEDURE — G8432 DEP SCR NOT DOC, RNG: HCPCS | Performed by: PHYSICIAN ASSISTANT

## 2022-04-14 PROCEDURE — G8536 NO DOC ELDER MAL SCRN: HCPCS | Performed by: PHYSICIAN ASSISTANT

## 2022-04-14 PROCEDURE — G8419 CALC BMI OUT NRM PARAM NOF/U: HCPCS | Performed by: PHYSICIAN ASSISTANT

## 2022-04-14 RX ORDER — DICLOFENAC SODIUM 75 MG/1
75 TABLET, DELAYED RELEASE ORAL 2 TIMES DAILY
Qty: 60 TABLET | Refills: 2 | Status: SHIPPED | OUTPATIENT
Start: 2022-04-14

## 2022-04-14 NOTE — PROGRESS NOTES
Patient: Asa Nguyen                MRN: [id number]       SSN: [id number]  YOB: 1955        AGE: 77 y.o. SEX: female          PCP: Ronny Ramírez MD  04/14/22    Chief Complaint   Patient presents with    Knee Pain     left knee pain       HISTORY:  Asa Nguyen is a 77 y.o. female presents to the office with a complaint of recurrent acute on chronic left knee pain. She is in treatment. With cortisone injections as well as oral anti-inflammatory medications. She does not wish any injections today. She has had some success with Motrin 800 mg taken 3 times a day in the past.  She is concerned though that the medication has become less effective than initially when started. Pain in the knee limits her ability to stand for short periods of time and walk short distances. Pain is at rest dull aching characteristic carries a pain rating of 3-4 on a 10 point scale. Pain with activity to include short periods of walking and standing as well as standing from a sitting position and sitting from a standing position increases diapers of a 7-8 on a 10 point scale. Ms. Joseph Ordaz also brings with her a DMV placard requesting today for handicap purposes. Pain Assessment  4/14/2022   Location of Pain Knee   Location Modifiers Left   Severity of Pain 9   Quality of Pain Sharp; Aching   Quality of Pain Comment weak   Duration of Pain Persistent   Frequency of Pain Constant   Aggravating Factors Walking;Standing;Bending   Aggravating Factors Comment -   Limiting Behavior -   Relieving Factors Nothing   Relieving Factors Comment -   Result of Injury No           No results found for: HBA1C, JLR2KHTX, DBV2NGFB  Weight Metrics 4/14/2022 11/20/2020 11/13/2020 11/4/2020 7/29/2020 3/10/2020 3/3/2020   Weight 181 lb 174 lb 9.6 oz 176 lb 3.2 oz 174 lb 3.2 oz 168 lb 3.2 oz 177 lb 9.6 oz 179 lb 6.4 oz   BMI 29.21 kg/m2 28.18 kg/m2 28.01 kg/m2 27.7 kg/m2 27.15 kg/m2 28.67 kg/m2 28.96 kg/m2            Problem List Items Addressed This Visit     None      Visit Diagnoses     Primary osteoarthritis of left knee    -  Primary    Relevant Medications    diclofenac EC (VOLTAREN) 75 mg EC tablet    Other Relevant Orders    AMB POC X-RAY KNEE 3 VIEW (Completed)    Chronic pain of left knee        Relevant Orders    AMB POC X-RAY KNEE 3 VIEW (Completed)          PAST MEDICAL HISTORY:   Past Medical History:   Diagnosis Date    Adverse effect of anesthesia     Arthritis     Hyperlipemia     Hypertension     Other ill-defined conditions(799.89)     High cholesterol    Psychiatric disorder     Depression       PAST SURGICAL HISTORY:   Past Surgical History:   Procedure Laterality Date    COLONOSCOPY N/A 7/2/2019    COLONOSCOPY performed by Cas Murcia MD at [facility] ENDOSCOPY    HX GYN      Hysterectomy    HX ORTHOPAEDIC      Broken hip    ID BREAST SURGERY PROCEDURE UNLISTED      Clogged milk duct (surgical)       ALLERGIES:   Allergies   Allergen Reactions    Doxycycline Swelling    Pcn [Penicillins] Hives     PATIENT IS NOT ALLERGIC TO AMOXICLLIN. CURRENT MEDICATIONS:  A list of medications prior to the time of admission include:  Prior to Admission medications    Medication Sig Start Date End Date Taking? Authorizing Provider   diclofenac EC (VOLTAREN) 75 mg EC tablet Take 1 Tablet by mouth two (2) times a day. 4/14/22  Yes Dandre Peraza PA-C   ibuprofen (MOTRIN) 800 mg tablet TAKE 1 TABLET BY MOUTH THREE TIMES DAILY WITH MEALS 12/27/21  Yes Sahra Hall MD   diclofenac (VOLTAREN) 1 % gel APPLY 2 GRAMS TO AFFECTED AREA FOUR TIMES DAILY 1/27/21  Yes Sahra Hall MD   venlafaxine (EFFEXOR) 37.5 mg tablet TAKE 1 TABLET BY MOUTH ONCE A DAY. 7/2/20  Yes Provider, Historical   diclofenac (Voltaren) 1 % gel Apply 2 g to affected area four (4) times daily.  4/7/20  Yes Sahra Hall MD   pantoprazole (PROTONIX) 40 mg tablet Take 40 mg by mouth.   Yes Provider, Elliot   meloxicam (MOBIC) 15 mg tablet Take 1 Tab by mouth daily (with breakfast). 2/20/19  Yes Darryl Fleischer L, PA   diclofenac EC (VOLTAREN) 50 mg EC tablet Take 1 Tab by mouth two (2) times daily (with meals). 2/1/19  Yes Felix Mabry MD   omega-3 fatty acids-vitamin e (FISH OIL) 1,000 mg Cap Take  by mouth. Yes Other, MD Thom   calcium-vitamin D (OSCAL 250) 250-125 mg-unit tablet Take 1 Tab by mouth daily. Yes Other, MD Thom   amlodipine (NORVASC) 10 mg tablet Take 10 mg by mouth daily. Yes Thom Harris MD   lisinopril (PRINIVIL, ZESTRIL) 20 mg tablet Take 20 mg by mouth daily. Yes Steven, MD Thom   atorvastatin (LIPITOR) 20 mg tablet Take  by mouth daily. Yes Steven, MD Thom   valacyclovir (VALTREX) 500 mg tablet Take  by mouth two (2) times a day. Yes Steven, MD Thom       FAMILY HISTORY: No family history on file. SOCIAL HISTORY:   Social History     Socioeconomic History    Marital status: UNKNOWN   Tobacco Use    Smoking status: Former Smoker    Smokeless tobacco: Never Used   Substance and Sexual Activity    Alcohol use: Yes     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 [id number]    Drug use: No     Comment: In residential treatment       ROS:No CP, No SOB, No fever/chills nor night sweats. No headaches, vision abnormalities to include double and or loss of vision. No dizziness. No hearing abnormalities. No Chest Pain nor Shortness of breath. Pt denies h/o spinal surgery, injections, or PT/chiropractor. Patient has attempted self treatment with less than adequate relief on oral and topical analgesic / anti inflammatory medications . Pt denies change in bowel or bladder habits. No saddle paresthesia / anesthesia. Pt denies fever, unplanned weight loss / weight gains, and no skin changes. Musculoskeletal pain per HPI. Pain is exacerbated positionally.        PHYSICAL EXAM:    Visit Vitals  Pulse 75   Temp 96.8 °F (36 °C) (Temporal)   Resp 16   Ht 5' 6\" (1.676 m) Wt 181 lb (82.1 kg)   SpO2 95%   BMI 29.21 kg/m²       Constitutional: Appears well-developed and well-nourished. No distress. Sitting comfortably in the exam room, interacting with conversation with pleasant affect. Gait appears steady and patient exhibits no evidence of ataxia. Patient is able to ambulate with caution. No focal neurological deficit noted. No facial droop, slurred speech, or evidence of altered mentation noted on exam.   Skin: Skin over the head, neck, bilateral limbs, and trunk is warm and dry. No rash or erythema noted. Cranial Nerves II-XII grossly intact  HENT: NC/AT. Normal symmetry, bulk and tone of facial and neck musculature. Trachea midline. No discernible thyromegaly or masses. No involuntary movements. Lymphatic: No preauricular, submandibuar, anterior or posterior cervical lymphadenopathy. Psychiatric: The patient is awake, alert, and oriented to person, place and time. Behavior is normal. Thought content normal.   Cardiovascular: No clubbing, cyanosis. No edema bilateral lower extremities. Pulmonary: No tripoding nor accessory muscle recruitment. Breathing normally, no distress, no audible wheezing. Distal cap refill intact at 2/2 Reinaldo UE / LE. Neuro intact Reinaldo UE/LE to noxious stimuli        Ortho Specific exam:      Left knee reveals no warmth, erythema, edema, or ecchymosis. Skin intact. There is a trace effusion noted. Active range of motion 100 degrees -5 with a varus deformity and extension plane. Patella tracks midline. There is crepitation through ranging. Quad and patellar tendons intact. No instability on varus or valgus stressing. MCL and LCL intact. ACL PCL also intact with no laxity or pain. No popliteal tenderness. No evidence of DVT left lower extremity.       [facility] of the left knee AP lateral and tunnel reveals tricompartmental osteoarthritis greatest in the patellofemoral and medial joint space.  Moderate narrowing of the lateral joint space. No lytic or blastic lesions. No soft tissue ossifications. No evidence of fracture deformity lesions or other masses. IMPRESSION:      ICD-10-CM ICD-9-CM    1. Primary osteoarthritis of left knee  M17.12 715.16 AMB POC X-RAY KNEE 3 VIEW   2. Chronic pain of left knee  M25.562 719.46 AMB POC X-RAY KNEE 3 VIEW    G89.29 338.29    3. Obesity BMI 29.21    PLAN: Today we discussed alternatives to care to include but not limited to switching from Motrin to diclofenac 75 mg enteric-coated tablets. Review of her medical record indicates she has been on that medication as recent as 2019. Patient was willing to switch. Patient was encouraged not to take any other nonsteroidal anti-inflammatory medicine with the diclofenac. Regarding her request for a DMV completion of form she was given a permanent placard completed form. She does not wish to proceed with any hyaluronic acid treatments regarding her left knee osteoarthritis. We will then plan on following up on a as needed basis. Weight loss strategies discussed below in order to decrease the axial loading forces on her hips knees and ankles and thereby reducing the onset of further osteoarthritic symptoms. Additionally today we discussed the diagnosis of obesity and the importance of weight management for both cardiovascular health. The patient was recommended to decrease carbohydrate and sugar intake. Patient recommended a formal dietary consult which they will consider and return a call to our office. In light of the patient's osteoarthritic findings I am making a recommendation for aerobic exercise to include but not limited to stationary bicycle, elliptical, therapeutic walking with good shoes and or swimming. Patient should avoid any running or jumping. If using the treadmill then recommendation for no elevation and no running or jogging.        Care plan outlined and precautions discussed. Results were reviewed with the patient. All medications were reviewed with the patient. All of pt's questions and concerns were addressed. Alarm symptoms and return precautions associated with chief complaint and evaluation were reviewed with the patient in detail. The patient demonstrated adequate understanding. The patient expresses willing compliance with the treatment plan. No Narcotic indicated today. Patient given pain medication for short term acute pain relief. Goal is to treat patient according to above plan and to ultimately have patient off all pain medications once appropriate. If chronic pain management is required beyond what is expected for current orthopedic problem, will refer patient to pain management.  was reviewed and will be reviewed with every medication refill request.         Patient provided a reminder for a \"due or due soon\" health maintenance. I have asked the patient to schedule an appointment with their primary care provider for follow-up on general health maintenance concerns. Today all the patient's questions were answered to their satisfaction. Copies of x-rays reviewed if obtained this visit, and provided to patient. Dictation disclaimer:  Please note that this dictation was completed with I and love and you, the computer voice recognition software. Quite often unanticipated grammatical, syntax, homophones, and other interpretive errors are inadvertently transcribed by the computer software. Please disregard these errors. Please excuse any errors that have escaped final proofreading. Hang GUILLERMO, APC, MPAS, PASTEPHEN  [facility]

## 2022-05-01 RX ORDER — IBUPROFEN 800 MG/1
TABLET ORAL
Qty: 120 TABLET | Refills: 2 | Status: SHIPPED | OUTPATIENT
Start: 2022-05-01

## 2022-11-14 ENCOUNTER — TRANSCRIBE ORDER (OUTPATIENT)
Dept: REGISTRATION | Age: 67
End: 2022-11-14

## 2022-11-14 ENCOUNTER — HOSPITAL ENCOUNTER (OUTPATIENT)
Dept: LAB | Age: 67
Discharge: HOME | End: 2022-11-14
Payer: MEDICARE

## 2022-11-14 DIAGNOSIS — E78.5 HYPERLIPEMIA: ICD-10-CM

## 2022-11-14 DIAGNOSIS — E21.1 SECONDARY HYPERPARATHYROIDISM, NON-RENAL (HCC): ICD-10-CM

## 2022-11-14 DIAGNOSIS — E21.1 SECONDARY HYPERPARATHYROIDISM, NON-RENAL (HCC): Primary | ICD-10-CM

## 2022-11-14 LAB
ALBUMIN SERPL-MCNC: 4.2 G/DL (ref 3.4–5)
ALBUMIN/GLOB SERPL: 1.4 {RATIO} (ref 0.8–1.7)
ALP SERPL-CCNC: 121 U/L (ref 45–117)
ALT SERPL-CCNC: 32 U/L (ref 13–56)
ANION GAP SERPL CALC-SCNC: 5 MMOL/L (ref 3–18)
AST SERPL-CCNC: 15 U/L (ref 10–38)
BILIRUB SERPL-MCNC: 0.7 MG/DL (ref 0.2–1)
BUN SERPL-MCNC: 9 MG/DL (ref 7–18)
BUN/CREAT SERPL: 14 (ref 12–20)
CALCIUM SERPL-MCNC: 10.1 MG/DL (ref 8.5–10.1)
CALCIUM SERPL-MCNC: 9.6 MG/DL (ref 8.5–10.1)
CHLORIDE SERPL-SCNC: 106 MMOL/L (ref 100–111)
CHOLEST SERPL-MCNC: 158 MG/DL
CO2 SERPL-SCNC: 29 MMOL/L (ref 21–32)
CREAT SERPL-MCNC: 0.64 MG/DL (ref 0.6–1.3)
GLOBULIN SER CALC-MCNC: 3.1 G/DL (ref 2–4)
GLUCOSE SERPL-MCNC: 89 MG/DL (ref 74–99)
HBA1C MFR BLD: 5.6 % (ref 4.2–5.6)
HDLC SERPL-MCNC: 70 MG/DL (ref 40–60)
HDLC SERPL: 2.3 {RATIO} (ref 0–5)
LDLC SERPL CALC-MCNC: 51.8 MG/DL (ref 0–100)
LIPID PROFILE,FLP: ABNORMAL
POTASSIUM SERPL-SCNC: 4.4 MMOL/L (ref 3.5–5.5)
PROT SERPL-MCNC: 7.3 G/DL (ref 6.4–8.2)
PTH-INTACT SERPL-MCNC: 50.8 PG/ML (ref 18.4–88)
SODIUM SERPL-SCNC: 140 MMOL/L (ref 136–145)
TRIGL SERPL-MCNC: 181 MG/DL (ref ?–150)
VLDLC SERPL CALC-MCNC: 36.2 MG/DL

## 2022-11-14 PROCEDURE — 36415 COLL VENOUS BLD VENIPUNCTURE: CPT

## 2022-11-14 PROCEDURE — 83970 ASSAY OF PARATHORMONE: CPT

## 2022-11-14 PROCEDURE — 80053 COMPREHEN METABOLIC PANEL: CPT

## 2022-11-14 PROCEDURE — 83036 HEMOGLOBIN GLYCOSYLATED A1C: CPT

## 2022-11-14 PROCEDURE — 80061 LIPID PANEL: CPT

## 2022-11-21 NOTE — TELEPHONE ENCOUNTER
flexeril not recommended for knee OA, PT will most likely help OA pain but if she would like to try it we can. normal...

## 2023-04-25 ENCOUNTER — HOSPITAL ENCOUNTER (OUTPATIENT)
Facility: HOSPITAL | Age: 68
Discharge: HOME | End: 2023-04-28
Payer: MEDICARE

## 2023-04-25 ENCOUNTER — TRANSCRIBE ORDERS (OUTPATIENT)
Facility: HOSPITAL | Age: 68
End: 2023-04-25

## 2023-04-25 DIAGNOSIS — Z13.220 SCREENING FOR LIPID DISORDERS: ICD-10-CM

## 2023-04-25 DIAGNOSIS — Z13.29 SCREENING FOR THYROID DISORDER: ICD-10-CM

## 2023-04-25 DIAGNOSIS — Z13.228 SCREENING FOR METABOLIC DISORDER: ICD-10-CM

## 2023-04-25 DIAGNOSIS — Z13.0 SCREENING FOR BLOOD DISEASE: ICD-10-CM

## 2023-04-25 DIAGNOSIS — Z13.29 SCREENING FOR THYROID DISORDER: Primary | ICD-10-CM

## 2023-04-25 LAB
ALBUMIN SERPL-MCNC: 4.4 G/DL (ref 3.4–5)
ALBUMIN/GLOB SERPL: 1.4 (ref 0.8–1.7)
ALP SERPL-CCNC: 91 U/L (ref 45–117)
ALT SERPL-CCNC: 35 U/L (ref 13–56)
ANION GAP SERPL CALC-SCNC: 3 MMOL/L (ref 3–18)
AST SERPL-CCNC: 18 U/L (ref 10–38)
BASOPHILS # BLD: 0 K/UL (ref 0–0.1)
BASOPHILS NFR BLD: 0 % (ref 0–2)
BILIRUB SERPL-MCNC: 0.6 MG/DL (ref 0.2–1)
BUN SERPL-MCNC: 14 MG/DL (ref 7–18)
BUN/CREAT SERPL: 22 (ref 12–20)
CALCIUM SERPL-MCNC: 9.9 MG/DL (ref 8.5–10.1)
CHLORIDE SERPL-SCNC: 106 MMOL/L (ref 100–111)
CHOLEST SERPL-MCNC: 170 MG/DL
CO2 SERPL-SCNC: 29 MMOL/L (ref 21–32)
CREAT SERPL-MCNC: 0.65 MG/DL (ref 0.6–1.3)
DIFFERENTIAL METHOD BLD: ABNORMAL
EOSINOPHIL # BLD: 0.2 K/UL (ref 0–0.4)
EOSINOPHIL NFR BLD: 2 % (ref 0–5)
ERYTHROCYTE [DISTWIDTH] IN BLOOD BY AUTOMATED COUNT: 13.2 % (ref 11.6–14.5)
GLOBULIN SER CALC-MCNC: 3.2 G/DL (ref 2–4)
GLUCOSE SERPL-MCNC: 92 MG/DL (ref 74–99)
HCT VFR BLD AUTO: 45.3 % (ref 35–45)
HDLC SERPL-MCNC: 107 MG/DL (ref 40–60)
HDLC SERPL: 1.6 (ref 0–5)
HGB BLD-MCNC: 15.2 G/DL (ref 12–16)
IMM GRANULOCYTES # BLD AUTO: 0 K/UL (ref 0–0.04)
IMM GRANULOCYTES NFR BLD AUTO: 0 % (ref 0–0.5)
LDLC SERPL CALC-MCNC: 50.2 MG/DL (ref 0–100)
LIPID PANEL: ABNORMAL
LYMPHOCYTES # BLD: 2.2 K/UL (ref 0.9–3.6)
LYMPHOCYTES NFR BLD: 32 % (ref 21–52)
MCH RBC QN AUTO: 31.7 PG (ref 24–34)
MCHC RBC AUTO-ENTMCNC: 33.6 G/DL (ref 31–37)
MCV RBC AUTO: 94.6 FL (ref 78–100)
MONOCYTES # BLD: 0.5 K/UL (ref 0.05–1.2)
MONOCYTES NFR BLD: 7 % (ref 3–10)
NEUTS SEG # BLD: 4 K/UL (ref 1.8–8)
NEUTS SEG NFR BLD: 59 % (ref 40–73)
NRBC # BLD: 0 K/UL (ref 0–0.01)
NRBC BLD-RTO: 0 PER 100 WBC
PLATELET # BLD AUTO: 221 K/UL (ref 135–420)
PMV BLD AUTO: 10.7 FL (ref 9.2–11.8)
POTASSIUM SERPL-SCNC: 4.6 MMOL/L (ref 3.5–5.5)
PROT SERPL-MCNC: 7.6 G/DL (ref 6.4–8.2)
RBC # BLD AUTO: 4.79 M/UL (ref 4.2–5.3)
SODIUM SERPL-SCNC: 138 MMOL/L (ref 136–145)
T4 FREE SERPL-MCNC: 1.2 NG/DL (ref 0.7–1.5)
TRIGL SERPL-MCNC: 64 MG/DL
TSH SERPL DL<=0.05 MIU/L-ACNC: 0.6 UIU/ML (ref 0.36–3.74)
VLDLC SERPL CALC-MCNC: 12.8 MG/DL
WBC # BLD AUTO: 6.8 K/UL (ref 4.6–13.2)

## 2023-04-25 PROCEDURE — 85025 COMPLETE CBC W/AUTO DIFF WBC: CPT

## 2023-04-25 PROCEDURE — 36415 COLL VENOUS BLD VENIPUNCTURE: CPT

## 2023-04-25 PROCEDURE — 80053 COMPREHEN METABOLIC PANEL: CPT

## 2023-04-25 PROCEDURE — 84439 ASSAY OF FREE THYROXINE: CPT

## 2023-04-25 PROCEDURE — 84443 ASSAY THYROID STIM HORMONE: CPT

## 2023-04-25 PROCEDURE — 80061 LIPID PANEL: CPT

## 2024-01-25 ENCOUNTER — HOSPITAL ENCOUNTER (OUTPATIENT)
Facility: HOSPITAL | Age: 69
Discharge: HOME | End: 2024-01-25
Payer: MEDICARE

## 2024-01-25 DIAGNOSIS — R10.2 PELVIC PAIN SYNDROME: ICD-10-CM

## 2024-01-25 PROCEDURE — 76856 US EXAM PELVIC COMPLETE: CPT

## 2024-07-09 ENCOUNTER — OFFICE VISIT (OUTPATIENT)
Age: 69
End: 2024-07-09
Payer: MEDICARE

## 2024-07-09 VITALS — RESPIRATION RATE: 16 BRPM | BODY MASS INDEX: 29.21 KG/M2 | HEIGHT: 66 IN

## 2024-07-09 DIAGNOSIS — M25.511 CHRONIC PAIN OF BOTH SHOULDERS: ICD-10-CM

## 2024-07-09 DIAGNOSIS — G89.29 CHRONIC PAIN OF BOTH SHOULDERS: ICD-10-CM

## 2024-07-09 DIAGNOSIS — M19.011 PRIMARY OSTEOARTHRITIS OF RIGHT SHOULDER: Primary | ICD-10-CM

## 2024-07-09 DIAGNOSIS — M19.012 PRIMARY OSTEOARTHRITIS, LEFT SHOULDER: ICD-10-CM

## 2024-07-09 DIAGNOSIS — M25.512 CHRONIC PAIN OF BOTH SHOULDERS: ICD-10-CM

## 2024-07-09 PROCEDURE — 1036F TOBACCO NON-USER: CPT | Performed by: ORTHOPAEDIC SURGERY

## 2024-07-09 PROCEDURE — G8427 DOCREV CUR MEDS BY ELIG CLIN: HCPCS | Performed by: ORTHOPAEDIC SURGERY

## 2024-07-09 PROCEDURE — G8400 PT W/DXA NO RESULTS DOC: HCPCS | Performed by: ORTHOPAEDIC SURGERY

## 2024-07-09 PROCEDURE — 20610 DRAIN/INJ JOINT/BURSA W/O US: CPT | Performed by: ORTHOPAEDIC SURGERY

## 2024-07-09 PROCEDURE — 99213 OFFICE O/P EST LOW 20 MIN: CPT | Performed by: ORTHOPAEDIC SURGERY

## 2024-07-09 PROCEDURE — 73030 X-RAY EXAM OF SHOULDER: CPT | Performed by: ORTHOPAEDIC SURGERY

## 2024-07-09 PROCEDURE — 1123F ACP DISCUSS/DSCN MKR DOCD: CPT | Performed by: ORTHOPAEDIC SURGERY

## 2024-07-09 PROCEDURE — 3017F COLORECTAL CA SCREEN DOC REV: CPT | Performed by: ORTHOPAEDIC SURGERY

## 2024-07-09 PROCEDURE — G8419 CALC BMI OUT NRM PARAM NOF/U: HCPCS | Performed by: ORTHOPAEDIC SURGERY

## 2024-07-09 PROCEDURE — 1090F PRES/ABSN URINE INCON ASSESS: CPT | Performed by: ORTHOPAEDIC SURGERY

## 2024-07-09 RX ORDER — TRIAMCINOLONE ACETONIDE 40 MG/ML
80 INJECTION, SUSPENSION INTRA-ARTICULAR; INTRAMUSCULAR ONCE
Status: COMPLETED | OUTPATIENT
Start: 2024-07-09 | End: 2024-07-09

## 2024-07-09 RX ORDER — LIDOCAINE HYDROCHLORIDE 10 MG/ML
6 INJECTION, SOLUTION INFILTRATION; PERINEURAL ONCE
Status: COMPLETED | OUTPATIENT
Start: 2024-07-09 | End: 2024-07-09

## 2024-07-09 RX ADMIN — LIDOCAINE HYDROCHLORIDE 6 ML: 10 INJECTION, SOLUTION INFILTRATION; PERINEURAL at 14:04

## 2024-07-09 RX ADMIN — TRIAMCINOLONE ACETONIDE 80 MG: 40 INJECTION, SUSPENSION INTRA-ARTICULAR; INTRAMUSCULAR at 14:04

## 2024-07-09 NOTE — PROGRESS NOTES
[facility] AMBULATORY OFFICE NOTE      Patient: Edna Holbrook                MRN: [id number]       SSN: [id number]  YOB: 1955        AGE: 68 y.o.        SEX: female  Body mass index is 29.21 kg/m².    PCP: Lisa Du MD  07/09/24    CHIEF COMPLAINT: Bilateral shoulder pain    HPI: Edna Holbrook is a 68 y.o. female patient who complains of bilateral shoulder pain for years.  Pain is worse at night when sleeping.  Pain with use of both arms.  She takes ibuprofen 800 which she says does help.  No history of surgery.    Past Medical History:   Diagnosis Date    Adverse effect of anesthesia     Arthritis     Hyperlipemia     Hypertension     Other ill-defined conditions(059.06)     High cholesterol    Psychiatric disorder     Depression       No family history on file.    Current Outpatient Medications   Medication Sig Dispense Refill    amLODIPine (NORVASC) 10 MG tablet Take 10 mg by mouth daily      atorvastatin (LIPITOR) 20 MG tablet Take by mouth daily      calcium carb-cholecalciferol 250-3.125 MG-MCG TABS per tab Take 1 tablet by mouth daily      diclofenac sodium (VOLTAREN) 1 % GEL APPLY 2 GRAMS TO AFFECTED AREA FOUR TIMES DAILY      diclofenac (VOLTAREN) 50 MG EC tablet Take 50 mg by mouth 2 times daily (with meals)      diclofenac (VOLTAREN) 75 MG EC tablet Take 75 mg by mouth 2 times daily      ibuprofen (ADVIL;MOTRIN) 800 MG tablet TAKE 1 TABLET BY MOUTH THREE TIMES DAILY WITH MEALS      lisinopril (PRINIVIL;ZESTRIL) 20 MG tablet Take 20 mg by mouth daily      meloxicam (MOBIC) 15 MG tablet Take 15 mg by mouth daily (with breakfast)      pantoprazole (PROTONIX) 40 MG tablet Take 40 mg by mouth      valACYclovir (VALTREX) 500 MG tablet Take by mouth 2 times daily      venlafaxine (EFFEXOR) 37.5 MG tablet TAKE 1 TABLET BY MOUTH ONCE A DAY.       No current facility-administered medications for this visit.       Allergies   Allergen Reactions    Doxycycline

## 2024-12-28 ENCOUNTER — APPOINTMENT (OUTPATIENT)
Facility: HOSPITAL | Age: 69
End: 2024-12-28
Payer: MEDICARE

## 2024-12-28 ENCOUNTER — HOSPITAL ENCOUNTER (EMERGENCY)
Facility: HOSPITAL | Age: 69
Discharge: HOME | End: 2024-12-28
Attending: EMERGENCY MEDICINE
Payer: MEDICARE

## 2024-12-28 VITALS
SYSTOLIC BLOOD PRESSURE: 122 MMHG | OXYGEN SATURATION: 97 % | TEMPERATURE: 97.9 F | DIASTOLIC BLOOD PRESSURE: 67 MMHG | HEART RATE: 73 BPM | RESPIRATION RATE: 16 BRPM

## 2024-12-28 DIAGNOSIS — E04.1 THYROID NODULE: ICD-10-CM
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
## 2024-12-28 PROCEDURE — 72125 CT NECK SPINE W/O DYE: CPT

## 2024-12-28 PROCEDURE — 6370000000 HC RX 637 (ALT 250 FOR IP): Performed by: PHYSICIAN ASSISTANT

## 2024-12-28 PROCEDURE — 99284 EMERGENCY DEPT VISIT MOD MDM: CPT

## 2024-12-28 PROCEDURE — 70450 CT HEAD/BRAIN W/O DYE: CPT

## 2024-12-28 PROCEDURE — 70486 CT MAXILLOFACIAL W/O DYE: CPT

## 2024-12-28 PROCEDURE — 71046 X-RAY EXAM CHEST 2 VIEWS: CPT

## 2024-12-28 RX ORDER — IBUPROFEN 600 MG/1
600 TABLET, FILM COATED ORAL
Status: COMPLETED | OUTPATIENT
Start: 2024-12-28 | End: 2024-12-28

## 2024-12-28 RX ORDER — CYCLOBENZAPRINE HCL 10 MG
10 TABLET ORAL NIGHTLY PRN
Qty: 10 TABLET | Refills: 0 | Status: SHIPPED | OUTPATIENT
Start: 2024-12-28 | End: 2025-01-07

## 2024-12-28 RX ORDER — LIDOCAINE 50 MG/G
1 PATCH TOPICAL DAILY
Qty: 10 PATCH | Refills: 0 | Status: SHIPPED | OUTPATIENT
Start: 2024-12-28 | End: 2025-01-07

## 2024-12-28 RX ORDER — HYDROXYZINE HYDROCHLORIDE 25 MG/1
25 TABLET, FILM COATED ORAL
Status: COMPLETED | OUTPATIENT
Start: 2024-12-28 | End: 2024-12-28

## 2024-12-28 RX ORDER — ACETAMINOPHEN 500 MG
1000 TABLET ORAL
Status: COMPLETED | OUTPATIENT
Start: 2024-12-28 | End: 2024-12-28

## 2024-12-28 RX ADMIN — IBUPROFEN 600 MG: 600 TABLET, FILM COATED ORAL at 09:53

## 2024-12-28 RX ADMIN — HYDROXYZINE HYDROCHLORIDE 25 MG: 25 TABLET, FILM COATED ORAL at 08:13

## 2024-12-28 RX ADMIN — ACETAMINOPHEN 1000 MG: 500 TABLET ORAL at 09:53

## 2024-12-28 ASSESSMENT — PAIN DESCRIPTION - DESCRIPTORS: DESCRIPTORS: ACHING;SORE

## 2024-12-28 ASSESSMENT — PAIN DESCRIPTION - LOCATION: LOCATION: OTHER (COMMENT)

## 2024-12-28 ASSESSMENT — PAIN SCALES - GENERAL: PAINLEVEL_OUTOF10: 8

## 2024-12-28 NOTE — DISCHARGE INSTRUCTIONS
Based on our clinical examination and testing, we feel that you are safe for discharge home today with the following precautions. Please note that if your condition changes, we would like to see you again. You may return at any time if you have further concerns. Following up with your primary care and/or specialist as indicated is paramount for your overall health and wellness.      Please follow-up with your Primary care provider in regards to the incidental findings on your CT scans discussed.      Return to ER immediately if having severe pain, persistent fevers >100.4F, difficulty breathing, persistent vomiting or vomiting blood, altered mentation or confusion, or any other concerning symptoms.       Please follow up as indicated below, and take medications as directed by pharmacy on prescription bottle and as indicated verbally to you here in the ER.       Thank you for visiting our Emergency Department today. Please let us know if there is anything else that we can help you with today.
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
## 2024-12-28 NOTE — ED NOTES
Patient medicated as per MAR, Patient inform of effects of medication. Allergies verified at this time by patient       Patient left to have scan done
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
## 2024-12-28 NOTE — ED NOTES
Patient medicated as per MAR, Patient inform of effects of medication. Allergies verified at this time by patient

## 2025-01-21 ENCOUNTER — TELEPHONE (OUTPATIENT)
Age: 70
End: 2025-01-21

## 2025-01-21 DIAGNOSIS — M17.12 PRIMARY OSTEOARTHRITIS OF LEFT KNEE: Primary | ICD-10-CM

## 2025-01-21 NOTE — TELEPHONE ENCOUNTER
Previous patient last seen 4/22. Patient has requested to receive the gel injections    Please advise patient @ [phone]

## 2025-02-24 ENCOUNTER — OFFICE VISIT (OUTPATIENT)
Age: 70
End: 2025-02-24

## 2025-02-24 VITALS — BODY MASS INDEX: 28.25 KG/M2 | WEIGHT: 180 LBS | HEIGHT: 67 IN | TEMPERATURE: 97.5 F

## 2025-02-24 DIAGNOSIS — M17.12 PRIMARY OSTEOARTHRITIS OF LEFT KNEE: Primary | ICD-10-CM

## 2025-02-24 NOTE — PROGRESS NOTES
[facility] AMBULATORY OFFICE NOTE    Patient: Edna Holbrook                MRN: [id number]       SSN: [id number]  YOB: 1955        AGE: 69 y.o.        SEX: female      OFFICE NOTE DICTATED    PCP: Lisa Du MD  02/24/25    Chief Complaint   Patient presents with    Knee Pain     Left         HISTORY:    Edna Holbrook is a 69 y.o. female returns to the office for initiation of Gelsyn 1 to her left knee.    Failed to redirect to the Timeline version of the [employer] SmartLink.    No results found for: \"HBA1C\", \"[id number]\"      2/24/2025    10:56 AM 4/14/2022     1:15 PM   Weight Metrics   Weight 180 lb 181 lb   BMI (Calculated) 28.7 kg/m2 29.3 kg/m2          Problem List Items Addressed This Visit    None  Visit Diagnoses       Primary osteoarthritis of left knee    -  Primary    Relevant Medications    sodium hyaluronate (Viscosup) injection SOSY 16.8 mg (Start on 2/24/2025 11:30 AM)    Other Relevant Orders    AMB POC US DRAIN/INJECT LARGE JOINT/BURSA            PAST MEDICAL HISTORY:       Diagnosis Date    Adverse effect of anesthesia     Arthritis     Hyperlipemia     Hypertension     Other ill-defined conditions(799.89)     High cholesterol    Psychiatric disorder     Depression        PAST SURGICAL HISTORY:       Procedure Laterality Date    BREAST SURGERY      Clogged milk duct (surgical)    COLONOSCOPY N/A 7/2/2019    [place] [facility] [place] [facility]    GYN      Hysterectomy    ORTHOPEDIC SURGERY      Broken hip        ALLERGIES:   Allergies   Allergen Reactions    Doxycycline Swelling    Penicillins Hives     PATIENT IS NOT ALLERGIC TO AMOXICLLIN.        CURRENT MEDICATIONS:  A list of medications prior to the time of admission include:  Prior to Admission medications    Medication Sig Start Date End Date Taking? Authorizing Provider   amLODIPine (NORVASC) 10 MG tablet Take 10 mg by mouth daily    Automatic Reconciliation, Ar

## 2025-03-07 ENCOUNTER — OFFICE VISIT (OUTPATIENT)
Age: 70
End: 2025-03-07

## 2025-03-07 VITALS — BODY MASS INDEX: 27.62 KG/M2 | HEIGHT: 67 IN | WEIGHT: 176 LBS | TEMPERATURE: 98.6 F

## 2025-03-07 DIAGNOSIS — M17.12 PRIMARY OSTEOARTHRITIS OF LEFT KNEE: Primary | ICD-10-CM

## 2025-03-07 NOTE — PROGRESS NOTES
[facility] AMBULATORY OFFICE NOTE    Patient: Edna Holbrook                MRN: [id number]       SSN: [id number]  YOB: 1955        AGE: 69 y.o.        SEX: female      OFFICE NOTE DICTATED    PCP: Lisa Du MD  03/07/25    Chief Complaint   Patient presents with    Knee Pain     Left         HISTORY:    Edna Holbrook is a 69 y.o. female returns to the office for continuation of Gelsyn 1 to her left knee.    Failed to redirect to the Timeline version of the [employer] SmartLink.    No results found for: \"HBA1C\", \"[id number]\"      3/7/2025     9:19 AM 2/24/2025    10:56 AM 4/14/2022     1:15 PM   Weight Metrics   Weight 176 lb 180 lb 181 lb   BMI (Calculated) 28 kg/m2 28.7 kg/m2 29.3 kg/m2          Problem List Items Addressed This Visit    None  Visit Diagnoses       Primary osteoarthritis of left knee    -  Primary    Relevant Medications    sodium hyaluronate (Viscosup) injection SOSY 16.8 mg (Start on 3/7/2025 10:00 AM)    Other Relevant Orders    AMB POC US DRAIN/INJECT LARGE JOINT/BURSA            PAST MEDICAL HISTORY:       Diagnosis Date    Adverse effect of anesthesia     Arthritis     Hyperlipemia     Hypertension     Other ill-defined conditions(799.89)     High cholesterol    Psychiatric disorder     Depression        PAST SURGICAL HISTORY:       Procedure Laterality Date    BREAST SURGERY      Clogged milk duct (surgical)    COLONOSCOPY N/A 7/2/2019    [place] [facility] [place] [facility]    GYN      Hysterectomy    ORTHOPEDIC SURGERY      Broken hip        ALLERGIES:   Allergies   Allergen Reactions    Doxycycline Swelling    Penicillins Hives     PATIENT IS NOT ALLERGIC TO AMOXICLLIN.        CURRENT MEDICATIONS:  A list of medications prior to the time of admission include:  Prior to Admission medications    Medication Sig Start Date End Date Taking? Authorizing Provider   amLODIPine (NORVASC) 10 MG tablet Take 10 mg by mouth

## 2025-03-14 ENCOUNTER — OFFICE VISIT (OUTPATIENT)
Age: 70
End: 2025-03-14

## 2025-03-14 VITALS — WEIGHT: 181 LBS | BODY MASS INDEX: 28.41 KG/M2 | HEIGHT: 67 IN

## 2025-03-14 DIAGNOSIS — M17.12 PRIMARY OSTEOARTHRITIS OF LEFT KNEE: Primary | ICD-10-CM

## 2025-03-14 NOTE — PROGRESS NOTES
of infection and instructed to go to the ER  if it is office after hours.         TOTAL TIME SPENT WITH PATIENT FOR TODAY'S VISIT WAS DEVOTED TO FACE-TO-FACE COUNSELING REGARDING THE FOLLOWING:  DISCUSSED DIAGNOSIS, TREATMENT OPTIONS, AND RISKS AND BENEFITS OF TREATMENT      Please Note:        [employer] using a frequency of 10MHz with a 12L-RS transducer head was used to confirm needle placement.  Ultrasound images captured using [employer] Ultrasound machine and scanned into patient's chart.          Special note: Medication management discussed and patient will begin the following per recommended dosing.    (  )     (  )     (Self-guided) Physical therapy ordered for range of motion all modalities, stretching, range of motion of specific functional group associated     with primary treating condition,  gentle strengthening of musculature with attention to increasing endurance, gait training,balance and fine motor coordination.       Special note: I reviewed and agree with the PFSH and the ROS as well as the intake form in the chart in the record.  I have reviewed prior medical record(s) in detail regarding this patient in this care appointment.            Appropriate for outpatient management. Will discuss supportive treatment, NSAIDS, RICE and orthopedic follow-up. Discussed treatment plan, return precautions, symptomatic relief, and expected time to improvement. All questions answered. Patient is stable for discharge and outpatient management. Medication use, risk/benefit, side effects, and precautions discussed.        Care plan outlined and precautions discussed.  Results were reviewed with the patient. All medications were reviewed with the patient. All of pt's questions and concerns were addressed.  Alarm symptoms and return precautions associated with chief complaint and evaluation were reviewed with the patient in detail.  The patient demonstrated adequate understanding.The patient expresses

## 2025-04-02 ENCOUNTER — HOSPITAL ENCOUNTER (OUTPATIENT)
Facility: HOSPITAL | Age: 70
Discharge: HOME | End: 2025-04-05
Payer: MEDICARE

## 2025-04-02 DIAGNOSIS — R07.89 OTHER CHEST PAIN: ICD-10-CM

## 2025-04-02 PROCEDURE — 71046 X-RAY EXAM CHEST 2 VIEWS: CPT

## 2025-04-23 ENCOUNTER — OFFICE VISIT (OUTPATIENT)
Age: 70
End: 2025-04-23
Payer: MEDICARE

## 2025-04-23 VITALS — BODY MASS INDEX: 28.12 KG/M2 | TEMPERATURE: 97.5 F | HEIGHT: 66 IN | WEIGHT: 175 LBS

## 2025-04-23 DIAGNOSIS — M17.12 PRIMARY OSTEOARTHRITIS OF LEFT KNEE: Primary | ICD-10-CM

## 2025-04-23 PROCEDURE — G8419 CALC BMI OUT NRM PARAM NOF/U: HCPCS | Performed by: PHYSICIAN ASSISTANT

## 2025-04-23 PROCEDURE — 1090F PRES/ABSN URINE INCON ASSESS: CPT | Performed by: PHYSICIAN ASSISTANT

## 2025-04-23 PROCEDURE — 3017F COLORECTAL CA SCREEN DOC REV: CPT | Performed by: PHYSICIAN ASSISTANT

## 2025-04-23 PROCEDURE — 99213 OFFICE O/P EST LOW 20 MIN: CPT | Performed by: PHYSICIAN ASSISTANT

## 2025-04-23 PROCEDURE — 1126F AMNT PAIN NOTED NONE PRSNT: CPT | Performed by: PHYSICIAN ASSISTANT

## 2025-04-23 PROCEDURE — G8428 CUR MEDS NOT DOCUMENT: HCPCS | Performed by: PHYSICIAN ASSISTANT

## 2025-04-23 PROCEDURE — 1123F ACP DISCUSS/DSCN MKR DOCD: CPT | Performed by: PHYSICIAN ASSISTANT

## 2025-04-23 PROCEDURE — G8400 PT W/DXA NO RESULTS DOC: HCPCS | Performed by: PHYSICIAN ASSISTANT

## 2025-04-23 PROCEDURE — 1036F TOBACCO NON-USER: CPT | Performed by: PHYSICIAN ASSISTANT

## 2025-04-23 NOTE — PROGRESS NOTES
[facility] AMBULATORY OFFICE NOTE    Patient: Edna Holbrook                MRN: [id number]       SSN: [id number]  YOB: 1955        AGE: 69 y.o.        SEX: female      OFFICE NOTE DICTATED    PCP: Lisa Du MD  04/23/25 4/23/2025: Patient returns to the office for 6-week follow-up from completion of Gelsyn viscosupplementation.  She is doing very well today.  She is maintained full weightbearing with improved range of motion of the left knee.      HISTORY:    Edna Holbrook is a 69 y.o. female returns to the office for completion of Gelsyn 1 to her left knee.      No results found for: \"HBA1C\", \"[id number]\"      4/23/2025     8:29 AM 3/14/2025     8:41 AM 3/7/2025     9:19 AM 2/24/2025    10:56 AM 4/14/2022     1:15 PM   Weight Metrics   Weight 175 lb 181 lb 176 lb 180 lb 181 lb   BMI (Calculated) 28.3 kg/m2 28.8 kg/m2 28 kg/m2 28.7 kg/m2 29.3 kg/m2          Problem List Items Addressed This Visit    None        PAST MEDICAL HISTORY:       Diagnosis Date    Adverse effect of anesthesia     Arthritis     Hyperlipemia     Hypertension     Other ill-defined conditions(799.89)     High cholesterol    Psychiatric disorder     Depression        PAST SURGICAL HISTORY:       Procedure Laterality Date    BREAST SURGERY      Clogged milk duct (surgical)    COLONOSCOPY N/A 7/2/2019    [place]OS[place] Melo [place] [facility] [place] [facility]    GYN      Hysterectomy    ORTHOPEDIC SURGERY      Broken hip        ALLERGIES:   Allergies   Allergen Reactions    Doxycycline Swelling    Penicillins Hives     PATIENT IS NOT ALLERGIC TO AMOXICLLIN.        CURRENT MEDICATIONS:  A list of medications prior to the time of admission include:  Prior to Admission medications    Medication Sig Start Date End Date Taking? Authorizing Provider   amLODIPine (NORVASC) 10 MG tablet Take 10 mg by mouth daily    Automatic Reconciliation, Ar   atorvastatin (LIPITOR) 20 MG tablet Take by

## 2025-04-28 ENCOUNTER — TELEPHONE (OUTPATIENT)
Age: 70
End: 2025-04-28

## 2025-04-28 NOTE — TELEPHONE ENCOUNTER
Patient states that she's having severe left knee pain & swelling after the gel injections.    She says her knee feels like it's swelling from the inside out, feels something is inside of it and it's really tight.    She wanted to advise ARTURO Pradhan first and see if he think's she needs to come back in to the office.    Please advise. Patient can be reached at [phone].

## 2025-05-08 NOTE — PROGRESS NOTES
tenderness - -   Osteophytes palpable - +   Jeniffer’s - -   Patella crepitus + +   Anterior drawer - -   Lateral laxity - -   Medial laxity - -   Varus deformity - -   Valgus deformity - -   Pretibial edema - -   Calf tenderness - -     RADIOGRAPHS:   4/14/22 XR LT KNEE 3V MICHAEL BRILLHART  tricompartmental osteoarthritis greatest in the patellofemoral and medial joint space. Moderate narrowing of the lateral joint space. No lytic or blastic lesions. No soft tissue ossifications. No evidence of fracture deformity lesions or other masses.   I independently reviewed these images today.  Moderate joint space narrowing. Condylar flattening and squaring. Osteophytes present.     XR BILAT KNEE 10/28/19 MICHAEL  IMPRESSION:  Three views - No fractures, no effusion, moderate joint space narrowing, no osteophytes present. Kellgren Riley grade 2     XR LEFT SHOULDER 4/7/17  IMPRESSION:  No fractures, no acromioclavicular narrowing, moderate glenohumeral narrowing, no calcific densities.     XR RIGHT HIP 4/7/17  IMPRESSION:  S/p ORIF hip fracture, moderate joint space narrowing bilateral, no osteophytes present.    PROCEDURE: Left knee injected 4 cc 0.25% Marcaine and 0.5 cc (3 mg) Celestone.  Chart reviewed for the following:   Michael ZELAYA MD, have reviewed the History, Physical and updated the Allergic reactions for Edna Holbrook     TIME OUT performed immediately prior to start of procedure:  Michael ZELAYA MD, have performed the following reviews on Edna Holbrook prior to the start of the procedure:            * Patient was identified by name and date of birth   * Agreement on procedure being performed was verified  * Risks and Benefits explained to the patient  * Procedure site verified and marked as necessary  * Patient was positioned for comfort  * Consent was obtained  Time: 3:53 PM  Date of procedure: 5/9/2025    Procedure performed by:  Dr. Olmos    Ms. Holbrook tolerated the procedure well with

## 2025-05-09 ENCOUNTER — OFFICE VISIT (OUTPATIENT)
Age: 70
End: 2025-05-09

## 2025-05-09 VITALS — WEIGHT: 174.8 LBS | BODY MASS INDEX: 28.09 KG/M2 | HEIGHT: 66 IN

## 2025-05-09 DIAGNOSIS — M25.562 CHRONIC PAIN OF LEFT KNEE: Primary | ICD-10-CM

## 2025-05-09 DIAGNOSIS — G89.29 CHRONIC PAIN OF LEFT KNEE: Primary | ICD-10-CM

## 2025-05-09 DIAGNOSIS — M17.12 UNILATERAL PRIMARY OSTEOARTHRITIS, LEFT KNEE: ICD-10-CM

## 2025-05-09 RX ORDER — BETAMETHASONE SODIUM PHOSPHATE AND BETAMETHASONE ACETATE 3; 3 MG/ML; MG/ML
3 INJECTION, SUSPENSION INTRA-ARTICULAR; INTRALESIONAL; INTRAMUSCULAR; SOFT TISSUE ONCE
Status: COMPLETED | OUTPATIENT
Start: 2025-05-09 | End: 2025-05-09

## 2025-05-09 RX ORDER — BUPIVACAINE HYDROCHLORIDE 5 MG/ML
4 INJECTION, SOLUTION PERINEURAL ONCE
Status: COMPLETED | OUTPATIENT
Start: 2025-05-09 | End: 2025-05-09

## 2025-05-09 RX ADMIN — BETAMETHASONE SODIUM PHOSPHATE AND BETAMETHASONE ACETATE 3 MG: 3; 3 INJECTION, SUSPENSION INTRA-ARTICULAR; INTRALESIONAL; INTRAMUSCULAR; SOFT TISSUE at 15:49

## 2025-05-09 RX ADMIN — BUPIVACAINE HYDROCHLORIDE 20 MG: 5 INJECTION, SOLUTION PERINEURAL at 15:49

## (undated) DEVICE — KENDALL 500 SERIES DIAPHORETIC FOAM MONITORING ELECTRODE - TEAR DROP SHAPE ( 30/PK): Brand: KENDALL

## (undated) DEVICE — MEDI-VAC NON-CONDUCTIVE SUCTION TUBING: Brand: CARDINAL HEALTH

## (undated) DEVICE — BITE BLK ENDOSCP AD 54FR GRN POLYETH ENDOSCP W STRP SLD

## (undated) DEVICE — SYR 50ML SLIP TIP NSAF LF STRL --

## (undated) DEVICE — BASIN EMESIS 500CC ROSE 250/CS 60/PLT: Brand: MEDEGEN MEDICAL PRODUCTS, LLC

## (undated) DEVICE — KIT COLON W/ 1.1OZ LUB AND 2 END

## (undated) DEVICE — DEVICE INFL 60ML 12ATM CONVENIENT LOK REL HNDL HI PRSS FLX

## (undated) DEVICE — FLEX ADVANTAGE 1500CC: Brand: FLEX ADVANTAGE